# Patient Record
Sex: FEMALE | Race: BLACK OR AFRICAN AMERICAN | NOT HISPANIC OR LATINO
[De-identification: names, ages, dates, MRNs, and addresses within clinical notes are randomized per-mention and may not be internally consistent; named-entity substitution may affect disease eponyms.]

---
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
## 2023-01-03 ENCOUNTER — APPOINTMENT (OUTPATIENT)
Dept: PEDIATRIC CARDIOLOGY | Facility: CLINIC | Age: 18
End: 2023-01-03
Payer: COMMERCIAL

## 2023-01-03 VITALS
HEIGHT: 66.14 IN | BODY MASS INDEX: 17.72 KG/M2 | OXYGEN SATURATION: 100 % | HEART RATE: 74 BPM | SYSTOLIC BLOOD PRESSURE: 119 MMHG | WEIGHT: 110.23 LBS | DIASTOLIC BLOOD PRESSURE: 73 MMHG

## 2023-01-03 DIAGNOSIS — Z91.89 OTHER SPECIFIED PERSONAL RISK FACTORS, NOT ELSEWHERE CLASSIFIED: ICD-10-CM

## 2023-01-03 PROCEDURE — 99214 OFFICE O/P EST MOD 30 MIN: CPT | Mod: 25

## 2023-01-03 PROCEDURE — 93000 ELECTROCARDIOGRAM COMPLETE: CPT

## 2023-01-03 PROCEDURE — 93306 TTE W/DOPPLER COMPLETE: CPT

## 2023-01-03 NOTE — HISTORY OF PRESENT ILLNESS
[FreeTextEntry1] : History and present illness, review of systems and discussion were carried forward from previous notes, updated and modified where appropriate.\par \par PAST and PRESENT history:\par I had the pleasure of seeing in the cardiology office in cardiac consultation.  As you know, this is a 9-year-old girl, who was referred to cardiology for cardiac evaluation in the setting of HgSS disease.  Patient caries a lifetime risk of developing congestive heart failure, systolic and diastolic dysfunction.  Parents report hemoglobin level of approx.. 6.9,  Patient has received one blood transfusions ever in 2011.  Patient has had no episodes of acute crisis in the past..  Patient's Meds are listed in this note. Patient has no cardiac complaints.  Patient has been asymptomatic from the cardiovascular point of view and thriving. There is no shortness of breath, orthopnea, pallor, cyanosis, diaphoresis, or loss of consciousness. Patient has been feeding well and gaining weight. Patient currently takes no cardiac medications.  There is no history of sudden death, syncope or pacemakers in the family. No congenital neurosensory deafness known in a close family member.\par DANIELLE is now 11 year old and continues to be asymptomatic from the cardiovascular point of view and thriving. Parent/s and patient deny shortness of breath, orthopnea, pallor, cyanosis, diaphoresis, or loss of consciousness. Patient has been feeding well and gaining weight.  DANIELLE had one abdominal crisis in Oct 2016; Her Hb is approx 7.6 g% ; She currently takes no cardiac medications.\par \par 04/23/2019  -DANIELLE is now 13 year old and continues to be asymptomatic from the cardiovascular point of view and thriving. Her Hb is 8.7. She is on Hydroxyurea therapy.. She no surgeries. Parents and DANIELLE deny shortness of breath, orthopnea, pallor, cyanosis, diaphoresis, or loss of consciousness. DANIELLE has been feeding well and gaining weight. DANIELLE currently takes no cardiac medications.\par \par 06/23/2020  -DANIELLE is now 14 year old. She continues to be asymptomatic from the cardiovascular point of view and thriving. There were no recent fevers, cough or any other Covid- 19 related signs and symptoms in the close family. Parents and DANIELLE deny shortness of breath, orthopnea, pallor, cyanosis, diaphoresis, or loss of consciousness. DANIELLE has been feeding well and gaining weight. DANIELLE currently takes no cardiac medications.\par \par 12/21/2021  -DANIELLE is now 16 year old. She continues to be asymptomatic from the cardiovascular point of view and thriving.  Her Hb is 8.2 gr% and stable. She did not have crisis lately.  DANIELLE was not sick with Covid 19 dis. and was vaccinated in Dec 2021 (booster). Mother and Father were vaccinated. There were no recent fevers, cough or any other Covid -19 related signs and symptoms in the close family.\par Parents and DANIELLE deny shortness of breath, orthopnea, pallor, cyanosis, diaphoresis, or loss of consciousness. DANIELLE has been feeding well and gaining weight. DANIELLE currently takes no cardiac medications. Other pain and pulmonary meds are listed bellow and include Hydroxyurea. \par \par 01/03/2023  -DANIELLE is now 17 year old. She continues to be asymptomatic from the cardiovascular point of view and thriving. 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 Parents and DANIELLE deny shortness of breath, orthopnea, pallor, cyanosis, diaphoresis, or loss of consciousness. Her Hb in Aug 2022 was 8 gr%. DANIELLE has been feeding well and gaining weight. DANIELLE currently takes no cardiac medications. She is on Hydroxyurea. There were no recent fevers, cough or any other Covid -19 related signs and symptoms in the close family.\par \par

## 2023-01-03 NOTE — CARDIOLOGY SUMMARY
[Today's Date] : [unfilled] [FreeTextEntry1] : QRS axis to 33° and NSR at a rate of 78 BPM. There was no atrial enlargement. There was no ventricular hypertrophy. There were no ST-T changes and all intervals were normal.\par  [FreeTextEntry2] : Summary:\par 1.  {S,D,S } Situs solitus, D-ventricular looping, normally related great arteries.\par 2. Normal right ventricular morphology with qualitatively normal size and systolic function.\par 3. 3 D volumetric analysis revealed a normal concentric contraction and normal RV EF of 58%.\par 4. Globular left ventricle and mildly dilated left ventricle.\par 5. Normal left ventricular systolic function.\par 6. Normal left ventricular diastolic function.\par 7. 3 D volumetric analysis revealed a normal concentric contraction and normal LV EF of 57%.     Normal cardiac index of 4.45 ml/min/m2.\par 8. Physiologic mitral valve regurgitation.\par 9. No evidence of pulmonary hypertension.\par 10. No pericardial effusion. \par 11. There has been no significant interval change..\par

## 2023-01-03 NOTE — END OF VISIT
[FreeTextEntry3] : I, Dr. Wood, personally performed the evaluation and management (E/M) services for this patient who is present today. E/M includes conducting the examination, assessing all new/exacerbated conditions, reassessing pre-existing conditions and setting up a new or updated plan of care. Today, the RN (Registered Nurse) documented the Chief Complaint, source of information and performed med and allergy reconciliation with or without education.\par

## 2023-01-03 NOTE — REASON FOR VISIT
[Mother] : mother [Patient] : patient [Initial Consultation] : an initial consultation for [Noncardiac Disease] : cardiovascular evaluation  [Sickle Cell Disease] : in the setting of sickle cell disease [Father] : father [FreeTextEntry3] : HbSS

## 2023-01-03 NOTE — CONSULT LETTER
[Today's Date] : [unfilled] [Name] : Name: [unfilled] [] : : ~~ [Today's Date:] : [unfilled] [Dear  ___:] : Dear Dr. [unfilled]: [Consult] : I had the pleasure of evaluating your patient, [unfilled]. My full evaluation follows. [Consult - Single Provider] : Thank you very much for allowing me to participate in the care of this patient. If you have any questions, please do not hesitate to contact me. [Sincerely,] : Sincerely, [DrDevyn  ___] : Dr. HERBERT [FreeTextEntry4] : Jaz Landaverde MD [FreeTextEntry5] : Pediatric Hematology [FreeTextEntry6] : [place] [address] [FreeTextEntry7] : Ph: [phone] [de-identified] : Tomasz Wood MD, FACC, FAAP\par Pediatric Cardiology\par [facility]\par [facility]\par Tel:    [phone]\par Fax:   [phone]\par Email: [email or web address] \par \par Email: [email or web address] <[email or web address] \par

## 2023-05-26 ENCOUNTER — NON-APPOINTMENT (OUTPATIENT)
Age: 18
End: 2023-05-26

## 2023-06-05 ENCOUNTER — OUTPATIENT (OUTPATIENT)
Dept: OUTPATIENT SERVICES | Age: 18
LOS: 1 days | Discharge: HOME | End: 2023-06-05

## 2023-06-05 ENCOUNTER — APPOINTMENT (OUTPATIENT)
Dept: PEDIATRIC PULMONARY CYSTIC FIB | Facility: CLINIC | Age: 18
End: 2023-06-05

## 2023-06-07 ENCOUNTER — APPOINTMENT (OUTPATIENT)
Dept: PEDIATRIC HEMATOLOGY/ONCOLOGY | Facility: CLINIC | Age: 18
End: 2023-06-07
Payer: COMMERCIAL

## 2023-06-07 VITALS
TEMPERATURE: 97.7 F | RESPIRATION RATE: 20 BRPM | HEIGHT: 66.54 IN | OXYGEN SATURATION: 98 % | DIASTOLIC BLOOD PRESSURE: 62 MMHG | HEART RATE: 65 BPM | SYSTOLIC BLOOD PRESSURE: 101 MMHG | WEIGHT: 108.25 LBS | BODY MASS INDEX: 17.19 KG/M2

## 2023-06-07 PROCEDURE — 99215 OFFICE O/P EST HI 40 MIN: CPT

## 2023-06-08 ENCOUNTER — NON-APPOINTMENT (OUTPATIENT)
Age: 18
End: 2023-06-08

## 2023-06-08 DIAGNOSIS — D57.1 SICKLE-CELL DISEASE WITHOUT CRISIS: ICD-10-CM

## 2023-06-12 NOTE — RESULTS/DATA
[FreeTextEntry1] : Labs: 12/19/2022\par CBC: WBC 7.7, ANC 4943, Hb 8.9, Platelets 232 retic pending\par Vit D 20 (decrease from 60)\par CMP wnl\par Iron studies wnl\par UA with blood and proteinuria (on menses)\par Hemoglobin Electrophoresis pending

## 2023-06-12 NOTE — PHYSICAL EXAM
[No focal deficits] : no focal deficits [Normal] : affect appropriate [de-identified] : supple [de-identified] : brisk CR

## 2023-06-12 NOTE — HISTORY OF PRESENT ILLNESS
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  She has occasional VOEs.  She started Hydroxyurea in 10/2015.  She has a history of LVH and follows with Cardiology.  Menarche at 10yo.  All 3 siblings are not an HLA match.  Met with transplant team, potential donor no longer available.\nando Admitted 6/29/19 with back pain not relieved with home meds.  Spiked temp 6/30/19, RVP and BCx neg, received empiric Ceftriaxone.  Hb decreased to 6.5g/dL, however had retic count and was asymptomatic.  At f/u outpatient was found to have improving Hb and reticulocytosis, however had symptoms of headache and dizziness.  Therefore received PRBCs on 7/6/19.\nando Had abdominal pain and emesis when she moved to school and was seen in the ED at [place] 8/14 where she was found to be dehydrated.  She did not require any admission. She remained afebrile. [de-identified] : Doing well.\par Afebrile.\par No recent illness.\par No ED visits or admissions since last visit.\par Reports adherence with daily Hydroxyurea.\par \par Normal menses, monthly, lasts 5 days,   2 ppd. Tolerable pain.\par \par Attends [employer].  Still pursuing nursing.  School went well.  Has been denied on campus housing despite our letter and filling in the appropriate forms.\par \par HEADSS:  feels safe at school and home; no smoking, drinking, vaping or illicit drug use; not sexually active.

## 2023-06-12 NOTE — CONSULT LETTER
[Dear  ___] : Dear  [unfilled], [Courtesy Letter:] : I had the pleasure of seeing your patient, [unfilled], in my office today. [Please see my note below.] : Please see my note below. [Consult Closing:] : Thank you very much for allowing me to participate in the care of this patient.  If you have any questions, please do not hesitate to contact me. [Sincerely,] : Sincerely, [FreeTextEntry2] : Mei Elliott M.D.\par [address]\[place] [address]\par Telephone #:  [phone]\par  [FreeTextEntry3] : Jaz Moran MD, MPH\par Attending Physician\par [facility]\par Hematology /Oncology and Stem Cell Transplantation\par  of Pediatrics\par [employer] [facility]

## 2023-06-12 NOTE — HISTORY OF PRESENT ILLNESS
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  She has occasional VOEs.  She started Hydroxyurea in 10/2015.  She has a history of LVH and follows with Cardiology.  Menarche at 12yo.  All 3 siblings are not an HLA match.  Met with transplant team, potential donor no longer available.\nando Admitted 6/29/19 with back pain not relieved with home meds.  Spiked temp 6/30/19, RVP and BCx neg, received empiric Ceftriaxone.  Hb decreased to 6.5g/dL, however had retic count and was asymptomatic.  At f/u outpatient was found to have improving Hb and reticulocytosis, however had symptoms of headache and dizziness.  Therefore received PRBCs on 7/6/19.\nando Had abdominal pain and emesis when she moved to school and was seen in the ED at [place] 8/14 where she was found to be dehydrated.  She did not require any admission. She remained afebrile. [de-identified] : Had a VOE of the abdomen at the beginning of the month.  \par Relieved with ibuprofen and also took Miralax.\par This occurred during finals.\par Was able to complete all the exams.\par Missed a couple of doses of Hydroxyurea during school.\par However, now that she is home, has missed 3 doses in 2 weeks.\par Afebrile.\par No recent illness.\par No ED visits or admissions since last visit.\par Normal menses, monthly, lasts 5 days,   2 ppd. Tolerable pain.\par Seen by Pulmonology.\par \par Attends [employer].  Interested in Nursing.  Doing well, no concerns.\par \par HEADSS:  feels safe at school and home; no smoking, drinking, vaping or illicit drug use; not sexually active.

## 2023-06-12 NOTE — CONSULT LETTER
[Dear  ___] : Dear  [unfilled], normal mood with appropriate affect [Courtesy Letter:] : I had the pleasure of seeing your patient, [unfilled], in my office today. [Please see my note below.] : Please see my note below. [Consult Closing:] : Thank you very much for allowing me to participate in the care of this patient.  If you have any questions, please do not hesitate to contact me. [Sincerely,] : Sincerely, [FreeTextEntry2] : Mei Elliott M.D.\par [address]\[place] [address]\par Telephone #:  [phone]\par  [FreeTextEntry3] : Jaz Moran MD, MPH\par Attending Physician\par [facility]\par Hematology /Oncology and Stem Cell Transplantation\par  of Pediatrics\par [employer] at [facility]

## 2023-06-12 NOTE — PHYSICAL EXAM
[No focal deficits] : no focal deficits [Normal] : affect appropriate [de-identified] : supple [de-identified] : brisk CR

## 2023-06-13 ENCOUNTER — NON-APPOINTMENT (OUTPATIENT)
Age: 18
End: 2023-06-13

## 2023-07-03 ENCOUNTER — INPATIENT (INPATIENT)
Facility: HOSPITAL | Age: 18
LOS: 0 days | Discharge: ANOTHER HOSPITAL | End: 2023-07-03
Attending: STUDENT IN AN ORGANIZED HEALTH CARE EDUCATION/TRAINING PROGRAM | Admitting: STUDENT IN AN ORGANIZED HEALTH CARE EDUCATION/TRAINING PROGRAM
Payer: COMMERCIAL

## 2023-07-03 ENCOUNTER — INPATIENT (INPATIENT)
Age: 18
LOS: 2 days | Discharge: HOME | End: 2023-07-06
Payer: COMMERCIAL

## 2023-07-03 ENCOUNTER — TRANSCRIPTION ENCOUNTER (OUTPATIENT)
Age: 18
End: 2023-07-03

## 2023-07-03 VITALS
DIASTOLIC BLOOD PRESSURE: 65 MMHG | HEART RATE: 92 BPM | SYSTOLIC BLOOD PRESSURE: 115 MMHG | TEMPERATURE: 98 F | RESPIRATION RATE: 16 BRPM | OXYGEN SATURATION: 100 %

## 2023-07-03 VITALS
TEMPERATURE: 98 F | OXYGEN SATURATION: 100 % | SYSTOLIC BLOOD PRESSURE: 110 MMHG | HEART RATE: 80 BPM | RESPIRATION RATE: 18 BRPM | DIASTOLIC BLOOD PRESSURE: 68 MMHG

## 2023-07-03 VITALS — HEIGHT: 55 IN | WEIGHT: 107.14 LBS

## 2023-07-03 DIAGNOSIS — D57.219 SICKLE-CELL/HB-C DISEASE WITH CRISIS, UNSPECIFIED: ICD-10-CM

## 2023-07-03 DIAGNOSIS — D57.00 HB-SS DISEASE WITH CRISIS, UNSPECIFIED: ICD-10-CM

## 2023-07-03 DIAGNOSIS — D57.1 SICKLE-CELL DISEASE WITHOUT CRISIS: ICD-10-CM

## 2023-07-03 DIAGNOSIS — Z29.9 ENCOUNTER FOR PROPHYLACTIC MEASURES, UNSPECIFIED: ICD-10-CM

## 2023-07-03 LAB
ANION GAP SERPL CALC-SCNC: 16 MMOL/L — HIGH (ref 7–14)
B PERT DNA SPEC QL NAA+PROBE: SIGNIFICANT CHANGE UP
B PERT+PARAPERT DNA PNL SPEC NAA+PROBE: SIGNIFICANT CHANGE UP
BASOPHILS # BLD AUTO: 0.1 K/UL — SIGNIFICANT CHANGE UP (ref 0–0.2)
BASOPHILS NFR BLD AUTO: 0.8 % — SIGNIFICANT CHANGE UP (ref 0–2)
BLD GP AB SCN SERPL QL: NEGATIVE — SIGNIFICANT CHANGE UP
BORDETELLA PARAPERTUSSIS (RAPRVP): SIGNIFICANT CHANGE UP
BUN SERPL-MCNC: 10 MG/DL — SIGNIFICANT CHANGE UP (ref 7–23)
C PNEUM DNA SPEC QL NAA+PROBE: SIGNIFICANT CHANGE UP
CALCIUM SERPL-MCNC: 9.4 MG/DL — SIGNIFICANT CHANGE UP (ref 8.4–10.5)
CHLORIDE SERPL-SCNC: 97 MMOL/L — LOW (ref 98–107)
CO2 SERPL-SCNC: 18 MMOL/L — LOW (ref 22–31)
CREAT SERPL-MCNC: 0.64 MG/DL — SIGNIFICANT CHANGE UP (ref 0.5–1.3)
EGFR: 130 ML/MIN/1.73M2 — SIGNIFICANT CHANGE UP
EOSINOPHIL # BLD AUTO: 0.11 K/UL — SIGNIFICANT CHANGE UP (ref 0–0.5)
EOSINOPHIL NFR BLD AUTO: 1.7 % — SIGNIFICANT CHANGE UP (ref 0–6)
FLUAV SUBTYP SPEC NAA+PROBE: SIGNIFICANT CHANGE UP
FLUBV RNA SPEC QL NAA+PROBE: SIGNIFICANT CHANGE UP
GLUCOSE SERPL-MCNC: 110 MG/DL — HIGH (ref 70–99)
HADV DNA SPEC QL NAA+PROBE: SIGNIFICANT CHANGE UP
HCG SERPL-ACNC: <1 MIU/ML — SIGNIFICANT CHANGE UP
HCOV 229E RNA SPEC QL NAA+PROBE: SIGNIFICANT CHANGE UP
HCOV HKU1 RNA SPEC QL NAA+PROBE: SIGNIFICANT CHANGE UP
HCOV NL63 RNA SPEC QL NAA+PROBE: SIGNIFICANT CHANGE UP
HCOV OC43 RNA SPEC QL NAA+PROBE: SIGNIFICANT CHANGE UP
HCT VFR BLD CALC: 20.4 % — CRITICAL LOW (ref 34.5–45)
HGB BLD-MCNC: 7.5 G/DL — LOW (ref 11.5–15.5)
HMPV RNA SPEC QL NAA+PROBE: SIGNIFICANT CHANGE UP
HPIV1 RNA SPEC QL NAA+PROBE: SIGNIFICANT CHANGE UP
HPIV2 RNA SPEC QL NAA+PROBE: SIGNIFICANT CHANGE UP
HPIV3 RNA SPEC QL NAA+PROBE: SIGNIFICANT CHANGE UP
HPIV4 RNA SPEC QL NAA+PROBE: SIGNIFICANT CHANGE UP
IANC: 8.55 K/UL — HIGH (ref 1.8–7.4)
IMM GRANULOCYTES NFR BLD AUTO: 3.3 % — HIGH (ref 0–0.9)
LYMPHOCYTES # BLD AUTO: 19.1 % — SIGNIFICANT CHANGE UP (ref 13–44)
LYMPHOCYTES # BLD AUTO: 2.22 K/UL — SIGNIFICANT CHANGE UP (ref 1–3.3)
M PNEUMO DNA SPEC QL NAA+PROBE: SIGNIFICANT CHANGE UP
MAGNESIUM SERPL-MCNC: 1.9 MG/DL — SIGNIFICANT CHANGE UP (ref 1.6–2.6)
MCHC RBC-ENTMCNC: 35.7 PG — HIGH (ref 27–34)
MCHC RBC-ENTMCNC: 36.8 GM/DL — HIGH (ref 32–36)
MCV RBC AUTO: 97.1 FL — SIGNIFICANT CHANGE UP (ref 80–100)
MONOCYTES # BLD AUTO: 1.01 K/UL — HIGH (ref 0–0.9)
MONOCYTES NFR BLD AUTO: 9.6 % — SIGNIFICANT CHANGE UP (ref 2–14)
NEUTROPHILS # BLD AUTO: 8.55 K/UL — HIGH (ref 1.8–7.4)
NEUTROPHILS NFR BLD AUTO: 64.3 % — SIGNIFICANT CHANGE UP (ref 43–77)
NRBC # BLD: 1 /100 WBCS — HIGH (ref 0–0)
NRBC # FLD: 0.12 K/UL — HIGH (ref 0–0)
PHOSPHATE SERPL-MCNC: 4.2 MG/DL — SIGNIFICANT CHANGE UP (ref 2.5–4.5)
PLATELET # BLD AUTO: 362 K/UL — SIGNIFICANT CHANGE UP (ref 150–400)
POTASSIUM SERPL-MCNC: 4.2 MMOL/L — SIGNIFICANT CHANGE UP (ref 3.5–5.3)
POTASSIUM SERPL-SCNC: 4.2 MMOL/L — SIGNIFICANT CHANGE UP (ref 3.5–5.3)
RAPID RVP RESULT: SIGNIFICANT CHANGE UP
RBC # BLD: 2.1 M/UL — LOW (ref 3.8–5.2)
RBC # BLD: 2.1 M/UL — LOW (ref 3.8–5.2)
RBC # FLD: 20.2 % — HIGH (ref 10.3–14.5)
RETICS #: 194.7 K/UL — HIGH (ref 25–125)
RETICS/RBC NFR: 9.3 % — HIGH (ref 0.5–2.5)
RH IG SCN BLD-IMP: POSITIVE — SIGNIFICANT CHANGE UP
RSV RNA SPEC QL NAA+PROBE: SIGNIFICANT CHANGE UP
RV+EV RNA SPEC QL NAA+PROBE: SIGNIFICANT CHANGE UP
SARS-COV-2 RNA SPEC QL NAA+PROBE: SIGNIFICANT CHANGE UP
SODIUM SERPL-SCNC: 131 MMOL/L — LOW (ref 135–145)
WBC # BLD: 12.4 K/UL — HIGH (ref 3.8–10.5)
WBC # FLD AUTO: 12.4 K/UL — HIGH (ref 3.8–10.5)

## 2023-07-03 PROCEDURE — 99285 EMERGENCY DEPT VISIT HI MDM: CPT

## 2023-07-03 PROCEDURE — 99223 1ST HOSP IP/OBS HIGH 75: CPT

## 2023-07-03 PROCEDURE — 71046 X-RAY EXAM CHEST 2 VIEWS: CPT | Mod: 26

## 2023-07-03 RX ORDER — MORPHINE SULFATE 50 MG/1
4 CAPSULE, EXTENDED RELEASE ORAL
Refills: 0 | Status: DISCONTINUED | OUTPATIENT
Start: 2023-07-03 | End: 2023-07-03

## 2023-07-03 RX ORDER — POLYETHYLENE GLYCOL 3350 17 G/17G
17 POWDER, FOR SOLUTION ORAL
Refills: 0 | Status: DISCONTINUED | OUTPATIENT
Start: 2023-07-03 | End: 2023-07-06

## 2023-07-03 RX ORDER — KETOROLAC TROMETHAMINE 30 MG/ML
24 SYRINGE (ML) INJECTION EVERY 6 HOURS
Refills: 0 | Status: DISCONTINUED | OUTPATIENT
Start: 2023-07-04 | End: 2023-07-05

## 2023-07-03 RX ORDER — MORPHINE SULFATE 50 MG/1
4 CAPSULE, EXTENDED RELEASE ORAL ONCE
Refills: 0 | Status: DISCONTINUED | OUTPATIENT
Start: 2023-07-03 | End: 2023-07-03

## 2023-07-03 RX ORDER — KETOROLAC TROMETHAMINE 30 MG/ML
15 SYRINGE (ML) INJECTION ONCE
Refills: 0 | Status: DISCONTINUED | OUTPATIENT
Start: 2023-07-03 | End: 2023-07-03

## 2023-07-03 RX ORDER — ALBUTEROL 90 UG/1
2 AEROSOL, METERED ORAL EVERY 4 HOURS
Refills: 0 | Status: DISCONTINUED | OUTPATIENT
Start: 2023-07-03 | End: 2023-07-06

## 2023-07-03 RX ORDER — SODIUM CHLORIDE 9 MG/ML
1000 INJECTION INTRAMUSCULAR; INTRAVENOUS; SUBCUTANEOUS
Refills: 0 | Status: DISCONTINUED | OUTPATIENT
Start: 2023-07-03 | End: 2023-07-03

## 2023-07-03 RX ORDER — HYDROXYUREA 500 MG/1
1500 CAPSULE ORAL DAILY
Refills: 0 | Status: DISCONTINUED | OUTPATIENT
Start: 2023-07-03 | End: 2023-07-03

## 2023-07-03 RX ORDER — ALBUTEROL 90 UG/1
2 AEROSOL, METERED ORAL EVERY 6 HOURS
Refills: 0 | Status: DISCONTINUED | OUTPATIENT
Start: 2023-07-03 | End: 2023-07-03

## 2023-07-03 RX ORDER — MORPHINE SULFATE 50 MG/1
5 CAPSULE, EXTENDED RELEASE ORAL
Refills: 0 | Status: DISCONTINUED | OUTPATIENT
Start: 2023-07-03 | End: 2023-07-04

## 2023-07-03 RX ORDER — FLUTICASONE PROPIONATE 220 MCG
1 AEROSOL WITH ADAPTER (GRAM) INHALATION
Refills: 0 | Status: DISCONTINUED | OUTPATIENT
Start: 2023-07-03 | End: 2023-07-06

## 2023-07-03 RX ORDER — ENOXAPARIN SODIUM 100 MG/ML
40 INJECTION SUBCUTANEOUS DAILY
Refills: 0 | Status: DISCONTINUED | OUTPATIENT
Start: 2023-07-03 | End: 2023-07-06

## 2023-07-03 RX ORDER — ACETAMINOPHEN 500 MG
1000 TABLET ORAL ONCE
Refills: 0 | Status: COMPLETED | OUTPATIENT
Start: 2023-07-03 | End: 2023-07-03

## 2023-07-03 RX ORDER — HEPARIN SODIUM 5000 [USP'U]/ML
5000 INJECTION INTRAVENOUS; SUBCUTANEOUS EVERY 8 HOURS
Refills: 0 | Status: DISCONTINUED | OUTPATIENT
Start: 2023-07-03 | End: 2023-07-03

## 2023-07-03 RX ORDER — FOLIC ACID 0.8 MG
1 TABLET ORAL DAILY
Refills: 0 | Status: DISCONTINUED | OUTPATIENT
Start: 2023-07-03 | End: 2023-07-03

## 2023-07-03 RX ORDER — DEXTROSE MONOHYDRATE, SODIUM CHLORIDE, AND POTASSIUM CHLORIDE 50; .745; 4.5 G/1000ML; G/1000ML; G/1000ML
1000 INJECTION, SOLUTION INTRAVENOUS
Refills: 0 | Status: DISCONTINUED | OUTPATIENT
Start: 2023-07-03 | End: 2023-07-06

## 2023-07-03 RX ORDER — ACETAMINOPHEN 500 MG
2 TABLET ORAL
Qty: 0 | Refills: 0 | DISCHARGE
Start: 2023-07-03

## 2023-07-03 RX ORDER — IBUPROFEN 200 MG
1 TABLET ORAL
Qty: 0 | Refills: 0 | DISCHARGE

## 2023-07-03 RX ORDER — CHOLECALCIFEROL (VITAMIN D3) 125 MCG
1000 CAPSULE ORAL DAILY
Refills: 0 | Status: DISCONTINUED | OUTPATIENT
Start: 2023-07-03 | End: 2023-07-06

## 2023-07-03 RX ORDER — ACETAMINOPHEN 500 MG
650 TABLET ORAL EVERY 6 HOURS
Refills: 0 | Status: DISCONTINUED | OUTPATIENT
Start: 2023-07-03 | End: 2023-07-03

## 2023-07-03 RX ORDER — FAMOTIDINE 10 MG/ML
20 INJECTION INTRAVENOUS
Refills: 0 | Status: DISCONTINUED | OUTPATIENT
Start: 2023-07-03 | End: 2023-07-06

## 2023-07-03 RX ORDER — KETOROLAC TROMETHAMINE 30 MG/ML
30 SYRINGE (ML) INJECTION EVERY 6 HOURS
Refills: 0 | Status: DISCONTINUED | OUTPATIENT
Start: 2023-07-03 | End: 2023-07-03

## 2023-07-03 RX ORDER — FOLIC ACID 0.8 MG
1 TABLET ORAL DAILY
Refills: 0 | Status: DISCONTINUED | OUTPATIENT
Start: 2023-07-03 | End: 2023-07-06

## 2023-07-03 RX ORDER — SENNA PLUS 8.6 MG/1
1 TABLET ORAL
Refills: 0 | Status: DISCONTINUED | OUTPATIENT
Start: 2023-07-03 | End: 2023-07-06

## 2023-07-03 RX ADMIN — Medication 400 MILLIGRAM(S): at 07:08

## 2023-07-03 RX ADMIN — MORPHINE SULFATE 4 MILLIGRAM(S): 50 CAPSULE, EXTENDED RELEASE ORAL at 08:45

## 2023-07-03 RX ADMIN — SODIUM CHLORIDE 60 MILLILITER(S): 9 INJECTION INTRAMUSCULAR; INTRAVENOUS; SUBCUTANEOUS at 18:41

## 2023-07-03 RX ADMIN — Medication 15 MILLIGRAM(S): at 09:44

## 2023-07-03 RX ADMIN — MORPHINE SULFATE 5 MILLIGRAM(S): 50 CAPSULE, EXTENDED RELEASE ORAL at 22:40

## 2023-07-03 RX ADMIN — Medication 30 MILLIGRAM(S): at 18:39

## 2023-07-03 RX ADMIN — POLYETHYLENE GLYCOL 3350 17 GRAM(S): 17 POWDER, FOR SOLUTION ORAL at 23:08

## 2023-07-03 RX ADMIN — Medication 1000 UNIT(S): at 23:08

## 2023-07-03 RX ADMIN — MORPHINE SULFATE 10 MILLIGRAM(S): 50 CAPSULE, EXTENDED RELEASE ORAL at 22:10

## 2023-07-03 RX ADMIN — SENNA PLUS 1 TABLET(S): 8.6 TABLET ORAL at 23:08

## 2023-07-03 RX ADMIN — MORPHINE SULFATE 4 MILLIGRAM(S): 50 CAPSULE, EXTENDED RELEASE ORAL at 17:20

## 2023-07-03 RX ADMIN — MORPHINE SULFATE 4 MILLIGRAM(S): 50 CAPSULE, EXTENDED RELEASE ORAL at 16:16

## 2023-07-03 RX ADMIN — DEXTROSE MONOHYDRATE, SODIUM CHLORIDE, AND POTASSIUM CHLORIDE 90 MILLILITER(S): 50; .745; 4.5 INJECTION, SOLUTION INTRAVENOUS at 22:13

## 2023-07-03 RX ADMIN — MORPHINE SULFATE 4 MILLIGRAM(S): 50 CAPSULE, EXTENDED RELEASE ORAL at 07:08

## 2023-07-03 RX ADMIN — Medication 15 MILLIGRAM(S): at 10:00

## 2023-07-03 RX ADMIN — FAMOTIDINE 20 MILLIGRAM(S): 10 INJECTION INTRAVENOUS at 23:47

## 2023-07-03 RX ADMIN — MORPHINE SULFATE 4 MILLIGRAM(S): 50 CAPSULE, EXTENDED RELEASE ORAL at 11:40

## 2023-07-03 RX ADMIN — Medication 30 MILLIGRAM(S): at 19:40

## 2023-07-03 RX ADMIN — MORPHINE SULFATE 4 MILLIGRAM(S): 50 CAPSULE, EXTENDED RELEASE ORAL at 08:24

## 2023-07-03 NOTE — PATIENT PROFILE ADULT - FUNCTIONAL SCREEN CURRENT LEVEL: COMMUNICATION, MLM
I was able to get a hold of Gricelda and did give her the results from that date she was in for visit 6/25/17.     Everything that we had tested her for was all Negative.     No treatment or follow up care needed or required.     She had no further concerns or questions at this time.      0 = understands/communicates without difficulty

## 2023-07-03 NOTE — ED PROVIDER NOTE - PROGRESS NOTE DETAILS
Carlos Eduardo Poole, DO PGY-3: After Tylenol Toradol, 2 rounds of morphine patient still in significant pain and required a third round of morphine.  Patient like to be admitted for pain control.

## 2023-07-03 NOTE — H&P ADULT - ASSESSMENT
17-year-old female with history of sickle cell SS with last admission for sickle cell disease years ago, presenting to ER for lower back pain and pain in bilateral thighs feeling like her typical sickle cell pain.

## 2023-07-03 NOTE — DISCHARGE NOTE PROVIDER - NSDCMRMEDTOKEN_GEN_ALL_CORE_FT
Flovent 44 mcg/inh inhalation aerosol with adapter: 2 puff(s) inhaled 2 times a day  folic acid 1 mg oral tablet: 1 tab(s) orally once a day  hydroxyurea 500 mg oral capsule: 3 caps for 5 days (Sunday to Thursday), 2 caps for 2 days (Friday to Saturday)  ibuprofen 400 mg oral tablet: 1 tab(s) orally every 6 hours until course of Oxycodone completed, then as needed every 6 hours.  lactulose 10 g/15 mL oral syrup: 30 milliliter(s) orally 2 times a day, As Needed for constipation  oxyCODONE 5 mg oral tablet: 1 tab every 4 hours for the first two days  1 tab every 6 hours for the next two days  1 tab every 6 hours as needed for pain MDD:30 mg  polyethylene glycol 3350 oral powder for reconstitution: 17 gram(s) orally 2 times a day, As Needed   senna oral tablet: 2 tab(s) orally once a day   Ventolin 90 mcg/inh inhalation aerosol: 2 puff(s) inhaled every 4 hours, As Needed shortness of breath  Vitamin D3 1000 intl units oral capsule: 1 cap(s) orally once a day   acetaminophen 325 mg oral tablet: 2 tab(s) orally every 6 hours As needed Temp greater or equal to 38C (100.4F), Mild Pain (1 - 3)  Flovent 44 mcg/inh inhalation aerosol with adapter: 2 puff(s) inhaled 2 times a day  folic acid 1 mg oral tablet: 1 tab(s) orally once a day  hydroxyurea 500 mg oral capsule: 3 caps for 5 days (Sunday to Thursday), 2 caps for 2 days (Friday to Saturday)  lactulose 10 g/15 mL oral syrup: 30 milliliter(s) orally 2 times a day, As Needed for constipation  oxyCODONE 5 mg oral tablet: 1 tab every 4 hours for the first two days  1 tab every 6 hours for the next two days  1 tab every 6 hours as needed for pain MDD:30 mg  polyethylene glycol 3350 oral powder for reconstitution: 17 gram(s) orally 2 times a day, As Needed   senna oral tablet: 2 tab(s) orally once a day   Ventolin 90 mcg/inh inhalation aerosol: 2 puff(s) inhaled every 4 hours, As Needed shortness of breath  Vitamin D3 1000 intl units oral capsule: 1 cap(s) orally once a day

## 2023-07-03 NOTE — H&P ADULT - HISTORY OF PRESENT ILLNESS
17-year-old female with history of sickle cell SS with last admission for sickle cell disease years ago, presenting to ER for lower back pain and pain in bilateral thighs feeling like her typical sickle cell pain.  Pain started last night while sleeping in bed, no trauma, no focal weakness or numbness.  Patient took ibuprofen and oxycodone prior to arrival with minimal relief.  No shortness of breath or cough or chest pains. Pt states normally does not need to take any pain killers.   Denies fever, chills , chest pain , abdominal pain, recent illness , sick contacts, dehydration.

## 2023-07-03 NOTE — H&P ADULT - PROBLEM SELECTOR PLAN 1
pt last pain crisis 4 years ago  denies dehydration, recent illness   IVF maintenance   Toradol and Morphine for pain control as per peds team - patient being transferred to peds   Do not think she requires PCA Pump - states her crisis have been controlled with oral medications   check CXR to r.o acute chest syndrome  denies pain in hips or shoulders - if has pain obtain xray of hips to r/o avascular necrosis   c/w home Hydroxyurea and folic acid

## 2023-07-03 NOTE — H&P ADULT - NSHPPHYSICALEXAM_GEN_ALL_CORE
.  VITAL SIGNS:  T(F): 98.3 (07-03-23 @ 16:16), Max: 98.5 (07-03-23 @ 12:39)  HR: 86 (07-03-23 @ 16:16) (75 - 97)  BP: 120/60 (07-03-23 @ 16:16) (110/68 - 120/60)  BP(mean): --  RR: 18 (07-03-23 @ 16:16) (16 - 18)  SpO2: 100% (07-03-23 @ 16:16) (98% - 100%)    PHYSICAL EXAM:    Constitutional: WDWN resting comfortably in bed; NAD  HEENT: NC/AT, PERRL, EOMI, anicteric sclera, no nasal discharge; uvula midline, no oropharyngeal erythema or exudates; MMM  Neck: supple; no JVD or thyromegaly  Respiratory: CTA B/L; no W/R/R, no retractions  Cardiac: +S1/S2; RRR; no M/R/G; PMI non-displaced  Gastrointestinal: soft, NT/ND; no rebound or guarding; +BSx4  Back: spine midline, no bony tenderness or step-offs; no CVAT B/L  Extremities: WWP, no clubbing or cyanosis; no peripheral edema  Musculoskeletal: NROM x4; no joint swelling, tenderness or erythema  Vascular: 2+ radial, femoral, DP/PT pulses B/L  Dermatologic: skin warm, dry and intact; no rashes, wounds, or scars  Lymphatic: no submandibular or cervical LAD  Neurologic: AAOx3; CNII-XII grossly intact; no focal deficits  Psychiatric: affect and characteristics of appearance, verbalizations, behaviors are appropriate, denies SI/HI/AH/VH

## 2023-07-03 NOTE — H&P ADULT - NSHPLABSRESULTS_GEN_ALL_CORE
LABS:                          7.5    12.40 )-----------( 362      ( 03 Jul 2023 07:00 )             20.4     07-03    131<L>  |  97<L>  |  10  ----------------------------<  110<H>  4.2   |  18<L>  |  0.64    Ca    9.4      03 Jul 2023 07:00  Phos  4.2     07-03  Mg     1.90     07-03

## 2023-07-03 NOTE — H&P ADULT - HISTORY OF PRESENT ILLNESS
Florina is a 17-year-old female with SCD (Hgb SS) on HU who presented to the [facility] ER for lower back pain and pain in bilateral thighs feeling like her typical sickle cell pain.  Pain started last night while sleeping in bed, no trauma, no focal weakness or numbness.  Patient took ibuprofen and oxycodone prior to arrival with minimal relief.  No shortness of breath or cough or chest pains. Recently finished menses within the lat couple of of days. Pt states normally does not need to take any pain killers. Pain currently 8/10 at lower back. Responds to pain medication initially but relief not lasting.  Denies fever, chills , chest pain , abdominal pain, recent illness , sick contacts, dehydration. Endorses constipation, no BM in last 2 days. Also endorses decreased PO intake over the last day due to pain.   Of note, patient was initially admitted to [facility], however was transferred to [facility] as she has not yet transitioned care to adult medicine.

## 2023-07-03 NOTE — H&P ADULT - NSHPREVIEWOFSYSTEMS_GEN_ALL_CORE
Gen: as above in HPI  HEENT: no changes in vision, no mouth sores, no URI symptoms  CV: no CP, no palpitations  Pulm: No SOB or increased WOB  Abd: +constipation, no abd pain or N/V  MSK: no acute pain in legs or arms at time of admission, no swelling of extremities  : No dysuria  Neuro: no headache, no AMS

## 2023-07-03 NOTE — DISCHARGE NOTE PROVIDER - HOSPITAL COURSE
17-year-old female with history of sickle cell SS with last admission for sickle cell disease years ago, presenting to ER for lower back pain and pain in bilateral thighs feeling like her typical sickle cell pain.       Problem/Plan - 1:  ·  Problem: Sickle cell crisis.   ·  Plan: pt last pain crisis 4 years ago  denies dehydration, recent illness   IVF maintenance   Toradol and Morphine for pain control as per peds team - patient being transferred to peds   Do not think she requires PCA Pump - states her crisis have been controlled with oral medications   check CXR to r.o acute chest syndrome  denies pain in hips or shoulders - if has pain obtain xray of hips to r/o avascular necrosis   c/w home Hydroxyurea and folic acid.     Problem/Plan - 2:  ·  Problem: Sickle cell anemia.   ·  Plan: hgb baseline 7.0 as per patient, hgb stable  monitor h/h   transfuse for hgb < 7  rest as above.     Problem/Plan - 3:  ·  Problem: Need for prophylactic measure.   ·  Plan: dvt ppx: heparin subq.

## 2023-07-03 NOTE — ED ADULT NURSE NOTE - NSFALLUNIVINTERV_ED_ALL_ED
Bed/Stretcher in lowest position, wheels locked, appropriate side rails in place/Call bell, personal items and telephone in reach/Instruct patient to call for assistance before getting out of bed/chair/stretcher/Non-slip footwear applied when patient is off stretcher/[place] to call system/Physically safe environment - no spills, clutter or unnecessary equipment/Purposeful proactive rounding/Room/bathroom lighting operational, light cord in reach

## 2023-07-03 NOTE — ED ADULT TRIAGE NOTE - CHIEF COMPLAINT QUOTE
lower back pain since yesterday, similar to crisis pain, endorsing no additional complaints - hx sickle cell

## 2023-07-03 NOTE — ED ADULT NURSE REASSESSMENT NOTE - NS ED NURSE REASSESS COMMENT FT1
Pt a&ox3, endorses pain to back, medicated as ordered, breathing even and unlabored, afebrile, skin cool dry and intact, ivl clear and patent, safety maintained, bed in lowest position, side rails up, father at bedside, will continue to monitor.
Report given to MANDY Ontiveros. Patient transported in stable condition to [facility].
Report received from MANDY Estrada. Patient A&Ox4 and ambulatory at baseline. Respirations even and unlabored, no signs/symptoms of acute distress. Patient denies dyspnea, shortness of breath, and chest pain. Patient rates pain 6/10 and states it is tolerable at this time. Safety measures in place and call bell within reach.

## 2023-07-03 NOTE — H&P ADULT - NSHPPHYSICALEXAM_GEN_ALL_CORE
T(C): 37.3 (07-03-23 @ 21:40), Max: 37.3 (07-03-23 @ 21:40)  HR: 96 (07-03-23 @ 21:40) (75 - 97)  BP: 106/60 (07-03-23 @ 21:40) (106/60 - 120/60)  RR: 24 (07-03-23 @ 21:40) (16 - 24)  SpO2: 100% (07-03-23 @ 21:40) (98% - 100%)    CONSTITUTIONAL: Well groomed, curled up in fetal position in bed due to pain but able to move around  EYES: PERRLA, No conjunctival or scleral injection, non-icteric  ENMT: Oral mucosa with moist membranes. Normal dentition             NECK: Supple  RESP: No respiratory distress, no use of accessory muscles; CTA b/l, no WRR  CV: RRR, +S1S2, no MRG; no JVD; no peripheral edema  GI: Soft, mild tenderness to palpation radiating to the back but no rigidity or distension; no palpable masses; no hepatosplenomegaly  MSK: no gross deformities, no swelling  SKIN: No rashes or ulcers noted; no subcutaneous nodules or induration palpable  NEURO: CN grossly intact  PSYCH: Appropriate

## 2023-07-03 NOTE — ED PROVIDER NOTE - PHYSICAL EXAMINATION
General: Patient alert in no apparent distress  Skin: Dry and intact  HEENT: Head atraumatic. Oral mucosa moist.   Eyes: Conjunctiva normal  Cardiac: Regular rhythm and rate. No pretibial edema b/l  Respiratory: Lungs clear b/l and symmetric. No respiratory distress. Able to speak in complete sentences.  Gastrointestinal: Abdomen soft, nondistended, nontender  Musculoskeletal: Moves all extremities spontaneously. no midline back spinal bony tenderness  Neurological: alert and oriented to person, place, and time  Psychiatric: Calm and cooperative

## 2023-07-03 NOTE — ED PROVIDER NOTE - CLINICAL SUMMARY MEDICAL DECISION MAKING FREE TEXT BOX
17-year-old female with history of sickle cell SS with last admission for sickle cell disease years ago, presenting to ER for lower back pain and pain in bilateral thighs feeling like her typical sickle cell pain.  Pain started last night while sleeping in bed, no trauma, no focal weakness or numbness.  Patient took ibuprofen and oxycodone prior to arrival with minimal relief.  No shortness of breath or cough or chest pains    Exam  No midline bony spinal tenderness  Normal range of motion of bilateral lower extremities  Lungs clear    Assessment/plan  Sickle cell pain crisis  Low suspicion for acutely life-threatening condition, no red flag signs to suggest cord compression or any infectious symptoms  We will do IV Tylenol, Toradol, morphine, labs with reticulocyte count and type and screen  Dispo pending results and symptoms on reevaluation

## 2023-07-03 NOTE — PATIENT PROFILE ADULT - FALL HARM RISK - HARM RISK INTERVENTIONS
Communicate Risk of Fall with Harm to all staff/Tailored Fall Risk Interventions/Bed in lowest position, wheels locked, appropriate side rails in place/Call bell, personal items and telephone in reach/Instruct patient to call for assistance before getting out of bed or chair/Non-slip footwear when patient is out of bed/[place] to call system/Physically safe environment - no spills, clutter or unnecessary equipment/Purposeful Proactive Rounding/Room/bathroom lighting operational, light cord in reach

## 2023-07-03 NOTE — H&P ADULT - ASSESSMENT
Florina is a 18 yo F with Hgb SS admitted for a VOE of her lower back, which was unresponsive to oral pain medications at home, now requiring IV pain medications. She is afebrile and hemodynamically stable.    Heme:  - Hgb lower than baseline at 7.5 but has elevated retic count, will recheck CBC in am  - Restart home HU  - Start Lovenox daily for DVT ppx    FEN/GI  - mild hyponatremia, likely secondary to dehydration  >> IVF @ 1M  - constipation  >> Senna and miralax BID  - Regular diet    Resp  - Restart home inhalers  - on RA  - Incentive spirometry    Neuro/Pain  - Morphine Q3 ATC, titrate as needed  - Toradol Q6 ATC

## 2023-07-03 NOTE — DISCHARGE NOTE PROVIDER - NSDCCPCAREPLAN_GEN_ALL_CORE_FT
PRINCIPAL DISCHARGE DIAGNOSIS  Diagnosis: Acute sickle cell crisis  Assessment and Plan of Treatment: you were admitted to hospital due to sickle cell crisis. You were treated with ______.

## 2023-07-03 NOTE — ED PROVIDER NOTE - OBJECTIVE STATEMENT
17-year-old female with history of sickle cell SS with last admission for sickle cell disease years ago, presenting to ER for lower back pain and pain in bilateral thighs feeling like her typical sickle cell pain.  Pain started last night while sleeping in bed, no trauma, no focal weakness or numbness.  Patient took ibuprofen and oxycodone prior to arrival with minimal relief.  No shortness of breath or cough or chest pains

## 2023-07-03 NOTE — ED ADULT NURSE NOTE - NS ED NURSE RECORD ANOTHER VITAL SIGN
head/facial injury, raccoon eyes, left wrist inj, s/p mech fall yesterday, last ASA 81 mg on 3/18, no loc Yes

## 2023-07-03 NOTE — H&P ADULT - NSHPLABSRESULTS_GEN_ALL_CORE
CBC Full  -  ( 03 Jul 2023 07:00 )  WBC Count : 12.40 K/uL  RBC Count : 2.10 M/uL  Hemoglobin : 7.5 g/dL  Hematocrit : 20.4 %  Platelet Count - Automated : 362 K/uL  Mean Cell Volume : 97.1 fL  Mean Cell Hemoglobin : 35.7 pg  Mean Cell Hemoglobin Concentration : 36.8 gm/dL  Auto Neutrophil # : 8.55 K/uL  Auto Lymphocyte # : 2.22 K/uL  Auto Monocyte # : 1.01 K/uL  Auto Eosinophil # : 0.11 K/uL  Auto Basophil # : 0.10 K/uL  Auto Neutrophil % : 64.3 %  Auto Lymphocyte % : 19.1 %  Auto Monocyte % : 9.6 %  Auto Eosinophil % : 1.7 %  Auto Basophil % : 0.8 %    07-03    131<L>  |  97<L>  |  10  ----------------------------<  110<H>  4.2   |  18<L>  |  0.64    Ca    9.4      03 Jul 2023 07:00  Phos  4.2     07-03  Mg     1.90     07-03

## 2023-07-03 NOTE — H&P ADULT - NSHPREVIEWOFSYSTEMS_GEN_ALL_CORE
REVIEW OF SYSTEMS:    CONSTITUTIONAL:  No weakness, fevers or chills  EYES/ENT:  No visual changes;  No vertigo or throat pain   NECK:  No pain or stiffness  RESPIRATORY:  No cough, wheezing, hemoptysis; No shortness of breath  CARDIOVASCULAR:  No chest pain or palpitations  GASTROINTESTINAL:  No abdominal or epigastric pain. No nausea, vomiting, or hematemesis; No diarrhea or constipation. No melena or hematochezia.  GENITOURINARY:  No dysuria, frequency or hematuria  MUSCULOSKELETAL: + back pain, FROM all extremities, normal strength, No calf tenderness  NEUROLOGICAL:  No numbness or weakness  SKIN:  No itching, rashes

## 2023-07-03 NOTE — ED ADULT NURSE NOTE - OBJECTIVE STATEMENT
Pt arrives to room 2 A&Ox4, ambulatory, on room air coming to ED c/o lower back pain. Pt history of sickle cell (last hospitalization x 4 years ago). Pt endorses pain began around midnight in her lower back and worsened. Pt states 10/10 pain. Pt denies chest pain, HA, SOB, N/V/D, fever/chills. Respirations even and unlabored, chest rise and fall equal b/l. Abdomen soft and nontender. Pending urine sample. Right 20g placed, labs drawn and sent to lab. Medicated per EMAR. safety maintained.

## 2023-07-03 NOTE — CHART NOTE - NSCHARTNOTEFT_GEN_A_CORE
Spoke to Peds fellow Dr Jacob Doty, recommending to give Toradol 30mg IVP Q 6hrs and Morphine 4 mg Q 3 hrs until PCA pump is set up. Also she wants patient transferred to Peds service of Dr Naz Koo.
[employer] [facility] [employer].    RVP Neg-- to be transferred over to [facility]  Sign out given to provider at [phone].    Lilly Elmore PA  Medicine [phone]

## 2023-07-04 LAB
ANION GAP SERPL CALC-SCNC: 12 MMOL/L — SIGNIFICANT CHANGE UP (ref 7–14)
ANISOCYTOSIS BLD QL: SIGNIFICANT CHANGE UP
BASOPHILS # BLD AUTO: 0 K/UL — SIGNIFICANT CHANGE UP (ref 0–0.2)
BASOPHILS NFR BLD AUTO: 0 % — SIGNIFICANT CHANGE UP (ref 0–2)
BUN SERPL-MCNC: 7 MG/DL — SIGNIFICANT CHANGE UP (ref 7–23)
C DIFF BY PCR RESULT: SIGNIFICANT CHANGE UP
CALCIUM SERPL-MCNC: 9.3 MG/DL — SIGNIFICANT CHANGE UP (ref 8.4–10.5)
CHLORIDE SERPL-SCNC: 103 MMOL/L — SIGNIFICANT CHANGE UP (ref 98–107)
CO2 SERPL-SCNC: 19 MMOL/L — LOW (ref 22–31)
CREAT SERPL-MCNC: 0.56 MG/DL — SIGNIFICANT CHANGE UP (ref 0.5–1.3)
EGFR: 135 ML/MIN/1.73M2 — SIGNIFICANT CHANGE UP
EOSINOPHIL # BLD AUTO: 0.09 K/UL — SIGNIFICANT CHANGE UP (ref 0–0.5)
EOSINOPHIL NFR BLD AUTO: 0.9 % — SIGNIFICANT CHANGE UP (ref 0–6)
GIANT PLATELETS BLD QL SMEAR: PRESENT — SIGNIFICANT CHANGE UP
GLUCOSE SERPL-MCNC: 107 MG/DL — HIGH (ref 70–99)
HCT VFR BLD CALC: 19.3 % — CRITICAL LOW (ref 34.5–45)
HGB BLD-MCNC: 7.1 G/DL — LOW (ref 11.5–15.5)
IANC: 7.3 K/UL — SIGNIFICANT CHANGE UP (ref 1.8–7.4)
LYMPHOCYTES # BLD AUTO: 1.74 K/UL — SIGNIFICANT CHANGE UP (ref 1–3.3)
LYMPHOCYTES # BLD AUTO: 18.4 % — SIGNIFICANT CHANGE UP (ref 13–44)
MACROCYTES BLD QL: SIGNIFICANT CHANGE UP
MAGNESIUM SERPL-MCNC: 1.9 MG/DL — SIGNIFICANT CHANGE UP (ref 1.6–2.6)
MCHC RBC-ENTMCNC: 35.9 PG — HIGH (ref 27–34)
MCHC RBC-ENTMCNC: 36.8 GM/DL — HIGH (ref 32–36)
MCV RBC AUTO: 97.5 FL — SIGNIFICANT CHANGE UP (ref 80–100)
METAMYELOCYTES # FLD: 0.9 % — SIGNIFICANT CHANGE UP (ref 0–1)
MICROCYTES BLD QL: SLIGHT — SIGNIFICANT CHANGE UP
MONOCYTES # BLD AUTO: 0.17 K/UL — SIGNIFICANT CHANGE UP (ref 0–0.9)
MONOCYTES NFR BLD AUTO: 1.8 % — LOW (ref 2–14)
NEUTROPHILS # BLD AUTO: 7.38 K/UL — SIGNIFICANT CHANGE UP (ref 1.8–7.4)
NEUTROPHILS NFR BLD AUTO: 71.9 % — SIGNIFICANT CHANGE UP (ref 43–77)
NEUTS BAND # BLD: 6.1 % — HIGH (ref 0–6)
NRBC # BLD: 5 /100 — HIGH (ref 0–0)
PHOSPHATE SERPL-MCNC: 3.4 MG/DL — SIGNIFICANT CHANGE UP (ref 2.5–4.5)
PLAT MORPH BLD: NORMAL — SIGNIFICANT CHANGE UP
PLATELET # BLD AUTO: 353 K/UL — SIGNIFICANT CHANGE UP (ref 150–400)
PLATELET COUNT - ESTIMATE: NORMAL — SIGNIFICANT CHANGE UP
POIKILOCYTOSIS BLD QL AUTO: SIGNIFICANT CHANGE UP
POLYCHROMASIA BLD QL SMEAR: SIGNIFICANT CHANGE UP
POTASSIUM SERPL-MCNC: 4.3 MMOL/L — SIGNIFICANT CHANGE UP (ref 3.5–5.3)
POTASSIUM SERPL-SCNC: 4.3 MMOL/L — SIGNIFICANT CHANGE UP (ref 3.5–5.3)
RBC # BLD: 1.98 M/UL — LOW (ref 3.8–5.2)
RBC # BLD: 1.98 M/UL — LOW (ref 3.8–5.2)
RBC # FLD: 20.8 % — HIGH (ref 10.3–14.5)
RBC BLD AUTO: NORMAL — SIGNIFICANT CHANGE UP
RETICS #: 174.6 K/UL — HIGH (ref 25–125)
RETICS/RBC NFR: 8.8 % — HIGH (ref 0.5–2.5)
SCHISTOCYTES BLD QL AUTO: SIGNIFICANT CHANGE UP
SODIUM SERPL-SCNC: 134 MMOL/L — LOW (ref 135–145)
SPHEROCYTES BLD QL SMEAR: SLIGHT — SIGNIFICANT CHANGE UP
TARGETS BLD QL SMEAR: SLIGHT — SIGNIFICANT CHANGE UP
WBC # BLD: 9.46 K/UL — SIGNIFICANT CHANGE UP (ref 3.8–10.5)
WBC # FLD AUTO: 9.46 K/UL — SIGNIFICANT CHANGE UP (ref 3.8–10.5)

## 2023-07-04 PROCEDURE — 99233 SBSQ HOSP IP/OBS HIGH 50: CPT

## 2023-07-04 RX ORDER — HYDROXYUREA 500 MG/1
1500 CAPSULE ORAL DAILY
Refills: 0 | Status: DISCONTINUED | OUTPATIENT
Start: 2023-07-04 | End: 2023-07-06

## 2023-07-04 RX ORDER — MORPHINE SULFATE 50 MG/1
6 CAPSULE, EXTENDED RELEASE ORAL
Refills: 0 | Status: DISCONTINUED | OUTPATIENT
Start: 2023-07-04 | End: 2023-07-05

## 2023-07-04 RX ORDER — MORPHINE SULFATE 50 MG/1
7.5 CAPSULE, EXTENDED RELEASE ORAL
Refills: 0 | Status: DISCONTINUED | OUTPATIENT
Start: 2023-07-04 | End: 2023-07-04

## 2023-07-04 RX ADMIN — MORPHINE SULFATE 6 MILLIGRAM(S): 50 CAPSULE, EXTENDED RELEASE ORAL at 17:10

## 2023-07-04 RX ADMIN — FAMOTIDINE 20 MILLIGRAM(S): 10 INJECTION INTRAVENOUS at 22:04

## 2023-07-04 RX ADMIN — DEXTROSE MONOHYDRATE, SODIUM CHLORIDE, AND POTASSIUM CHLORIDE 90 MILLILITER(S): 50; .745; 4.5 INJECTION, SOLUTION INTRAVENOUS at 19:27

## 2023-07-04 RX ADMIN — Medication 24 MILLIGRAM(S): at 19:20

## 2023-07-04 RX ADMIN — Medication 24 MILLIGRAM(S): at 13:30

## 2023-07-04 RX ADMIN — POLYETHYLENE GLYCOL 3350 17 GRAM(S): 17 POWDER, FOR SOLUTION ORAL at 22:04

## 2023-07-04 RX ADMIN — Medication 24 MILLIGRAM(S): at 06:41

## 2023-07-04 RX ADMIN — MORPHINE SULFATE 15 MILLIGRAM(S): 50 CAPSULE, EXTENDED RELEASE ORAL at 13:50

## 2023-07-04 RX ADMIN — Medication 1 MILLIGRAM(S): at 10:40

## 2023-07-04 RX ADMIN — FAMOTIDINE 20 MILLIGRAM(S): 10 INJECTION INTRAVENOUS at 10:39

## 2023-07-04 RX ADMIN — POLYETHYLENE GLYCOL 3350 17 GRAM(S): 17 POWDER, FOR SOLUTION ORAL at 10:40

## 2023-07-04 RX ADMIN — Medication 24 MILLIGRAM(S): at 00:40

## 2023-07-04 RX ADMIN — MORPHINE SULFATE 10 MILLIGRAM(S): 50 CAPSULE, EXTENDED RELEASE ORAL at 01:06

## 2023-07-04 RX ADMIN — MORPHINE SULFATE 12 MILLIGRAM(S): 50 CAPSULE, EXTENDED RELEASE ORAL at 22:58

## 2023-07-04 RX ADMIN — MORPHINE SULFATE 5 MILLIGRAM(S): 50 CAPSULE, EXTENDED RELEASE ORAL at 01:30

## 2023-07-04 RX ADMIN — MORPHINE SULFATE 10 MILLIGRAM(S): 50 CAPSULE, EXTENDED RELEASE ORAL at 04:05

## 2023-07-04 RX ADMIN — MORPHINE SULFATE 6 MILLIGRAM(S): 50 CAPSULE, EXTENDED RELEASE ORAL at 20:25

## 2023-07-04 RX ADMIN — MORPHINE SULFATE 10 MILLIGRAM(S): 50 CAPSULE, EXTENDED RELEASE ORAL at 06:58

## 2023-07-04 RX ADMIN — MORPHINE SULFATE 7.5 MILLIGRAM(S): 50 CAPSULE, EXTENDED RELEASE ORAL at 11:30

## 2023-07-04 RX ADMIN — MORPHINE SULFATE 5 MILLIGRAM(S): 50 CAPSULE, EXTENDED RELEASE ORAL at 04:30

## 2023-07-04 RX ADMIN — MORPHINE SULFATE 12 MILLIGRAM(S): 50 CAPSULE, EXTENDED RELEASE ORAL at 19:52

## 2023-07-04 RX ADMIN — MORPHINE SULFATE 5 MILLIGRAM(S): 50 CAPSULE, EXTENDED RELEASE ORAL at 07:20

## 2023-07-04 RX ADMIN — MORPHINE SULFATE 12 MILLIGRAM(S): 50 CAPSULE, EXTENDED RELEASE ORAL at 16:38

## 2023-07-04 RX ADMIN — Medication 1 PUFF(S): at 19:35

## 2023-07-04 RX ADMIN — Medication 24 MILLIGRAM(S): at 18:50

## 2023-07-04 RX ADMIN — DEXTROSE MONOHYDRATE, SODIUM CHLORIDE, AND POTASSIUM CHLORIDE 90 MILLILITER(S): 50; .745; 4.5 INJECTION, SOLUTION INTRAVENOUS at 07:21

## 2023-07-04 RX ADMIN — HYDROXYUREA 1500 MILLIGRAM(S): 500 CAPSULE ORAL at 22:02

## 2023-07-04 RX ADMIN — Medication 24 MILLIGRAM(S): at 06:57

## 2023-07-04 RX ADMIN — SENNA PLUS 1 TABLET(S): 8.6 TABLET ORAL at 22:04

## 2023-07-04 RX ADMIN — SENNA PLUS 1 TABLET(S): 8.6 TABLET ORAL at 10:40

## 2023-07-04 RX ADMIN — Medication 1 PUFF(S): at 09:09

## 2023-07-04 RX ADMIN — MORPHINE SULFATE 6 MILLIGRAM(S): 50 CAPSULE, EXTENDED RELEASE ORAL at 23:18

## 2023-07-04 RX ADMIN — Medication 24 MILLIGRAM(S): at 12:53

## 2023-07-04 RX ADMIN — Medication 1000 UNIT(S): at 10:40

## 2023-07-04 RX ADMIN — ENOXAPARIN SODIUM 40 MILLIGRAM(S): 100 INJECTION SUBCUTANEOUS at 10:40

## 2023-07-04 RX ADMIN — Medication 24 MILLIGRAM(S): at 01:00

## 2023-07-04 RX ADMIN — MORPHINE SULFATE 15 MILLIGRAM(S): 50 CAPSULE, EXTENDED RELEASE ORAL at 10:47

## 2023-07-04 NOTE — PROGRESS NOTE PEDS - ASSESSMENT
Florina is a 18 yo F with Hgb SS admitted for a VOE of her lower back, which was unresponsive to oral pain medications at home, now requiring IV pain medications. She is afebrile and hemodynamically stable.    Increased morphine from 5mg to 7.5mg intially today, but excess sedation so decreased to 6mg q3h with adequate pain control. Will wean as tolerated    Heme:  - Continue hydroxyurea  - Lovenox daily for DVT ppx  - recheck CBC in AM (hemoglobin 7.5 --> 7.1 today)    FEN/GI  >> IVF @ 1M  - constipation  >> Senna and miralax BID  - Regular diet    Resp  - Restart home inhalers  - on RA  - Incentive spirometry    Neuro/Pain  - Morphine 6mg Q3 ATC, titrate as needed  - Toradol Q6 ATC

## 2023-07-05 LAB
ANISOCYTOSIS BLD QL: SIGNIFICANT CHANGE UP
B PERT DNA SPEC QL NAA+PROBE: SIGNIFICANT CHANGE UP
B PERT+PARAPERT DNA PNL SPEC NAA+PROBE: SIGNIFICANT CHANGE UP
BASOPHILS # BLD AUTO: 0 K/UL — SIGNIFICANT CHANGE UP (ref 0–0.2)
BASOPHILS NFR BLD AUTO: 0 % — SIGNIFICANT CHANGE UP (ref 0–2)
BORDETELLA PARAPERTUSSIS (RAPRVP): SIGNIFICANT CHANGE UP
C PNEUM DNA SPEC QL NAA+PROBE: SIGNIFICANT CHANGE UP
ELLIPTOCYTES BLD QL SMEAR: SLIGHT — SIGNIFICANT CHANGE UP
EOSINOPHIL # BLD AUTO: 0.19 K/UL — SIGNIFICANT CHANGE UP (ref 0–0.5)
EOSINOPHIL NFR BLD AUTO: 1.8 % — SIGNIFICANT CHANGE UP (ref 0–6)
FLUAV SUBTYP SPEC NAA+PROBE: SIGNIFICANT CHANGE UP
FLUBV RNA SPEC QL NAA+PROBE: SIGNIFICANT CHANGE UP
GIANT PLATELETS BLD QL SMEAR: PRESENT — SIGNIFICANT CHANGE UP
HADV DNA SPEC QL NAA+PROBE: SIGNIFICANT CHANGE UP
HCOV 229E RNA SPEC QL NAA+PROBE: SIGNIFICANT CHANGE UP
HCOV HKU1 RNA SPEC QL NAA+PROBE: SIGNIFICANT CHANGE UP
HCOV NL63 RNA SPEC QL NAA+PROBE: SIGNIFICANT CHANGE UP
HCOV OC43 RNA SPEC QL NAA+PROBE: SIGNIFICANT CHANGE UP
HCT VFR BLD CALC: 19.2 % — CRITICAL LOW (ref 34.5–45)
HEMOGLOBIN INTERPRETATION: SIGNIFICANT CHANGE UP
HGB A MFR BLD: 0 % — LOW (ref 95–97.6)
HGB A2 MFR BLD: 3.8 % — HIGH (ref 2.4–3.5)
HGB BLD-MCNC: 7.1 G/DL — LOW (ref 11.5–15.5)
HGB F MFR BLD: 13.2 % — HIGH (ref 0–1.5)
HGB S MFR BLD: 83 % — HIGH
HMPV RNA SPEC QL NAA+PROBE: SIGNIFICANT CHANGE UP
HPIV1 RNA SPEC QL NAA+PROBE: SIGNIFICANT CHANGE UP
HPIV2 RNA SPEC QL NAA+PROBE: SIGNIFICANT CHANGE UP
HPIV3 RNA SPEC QL NAA+PROBE: SIGNIFICANT CHANGE UP
HPIV4 RNA SPEC QL NAA+PROBE: SIGNIFICANT CHANGE UP
IANC: 6.83 K/UL — SIGNIFICANT CHANGE UP (ref 1.8–7.4)
LYMPHOCYTES # BLD AUTO: 19.6 % — SIGNIFICANT CHANGE UP (ref 13–44)
LYMPHOCYTES # BLD AUTO: 2.01 K/UL — SIGNIFICANT CHANGE UP (ref 1–3.3)
M PNEUMO DNA SPEC QL NAA+PROBE: SIGNIFICANT CHANGE UP
MACROCYTES BLD QL: SIGNIFICANT CHANGE UP
MANUAL SMEAR VERIFICATION: SIGNIFICANT CHANGE UP
MCHC RBC-ENTMCNC: 36 PG — HIGH (ref 27–34)
MCHC RBC-ENTMCNC: 37 GM/DL — HIGH (ref 32–36)
MCV RBC AUTO: 97.5 FL — SIGNIFICANT CHANGE UP (ref 80–100)
MICROCYTES BLD QL: SLIGHT — SIGNIFICANT CHANGE UP
MONOCYTES # BLD AUTO: 0.64 K/UL — SIGNIFICANT CHANGE UP (ref 0–0.9)
MONOCYTES NFR BLD AUTO: 6.2 % — SIGNIFICANT CHANGE UP (ref 2–14)
MRSA PCR RESULT.: SIGNIFICANT CHANGE UP
NEUTROPHILS # BLD AUTO: 6.89 K/UL — SIGNIFICANT CHANGE UP (ref 1.8–7.4)
NEUTROPHILS NFR BLD AUTO: 60.7 % — SIGNIFICANT CHANGE UP (ref 43–77)
NEUTS BAND # BLD: 6.3 % — HIGH (ref 0–6)
NRBC # BLD: 15 /100 — HIGH (ref 0–0)
OVALOCYTES BLD QL SMEAR: SLIGHT — SIGNIFICANT CHANGE UP
PLAT MORPH BLD: NORMAL — SIGNIFICANT CHANGE UP
PLATELET # BLD AUTO: 305 K/UL — SIGNIFICANT CHANGE UP (ref 150–400)
PLATELET COUNT - ESTIMATE: NORMAL — SIGNIFICANT CHANGE UP
POIKILOCYTOSIS BLD QL AUTO: SIGNIFICANT CHANGE UP
POLYCHROMASIA BLD QL SMEAR: SLIGHT — SIGNIFICANT CHANGE UP
RAPID RVP RESULT: SIGNIFICANT CHANGE UP
RBC # BLD: 1.97 M/UL — LOW (ref 3.8–5.2)
RBC # BLD: 1.97 M/UL — LOW (ref 3.8–5.2)
RBC # FLD: 21 % — HIGH (ref 10.3–14.5)
RBC BLD AUTO: NORMAL — SIGNIFICANT CHANGE UP
RETICS #: 201.7 K/UL — HIGH (ref 25–125)
RETICS/RBC NFR: 10.2 % — HIGH (ref 0.5–2.5)
RSV RNA SPEC QL NAA+PROBE: SIGNIFICANT CHANGE UP
RV+EV RNA SPEC QL NAA+PROBE: SIGNIFICANT CHANGE UP
S AUREUS DNA NOSE QL NAA+PROBE: DETECTED
SARS-COV-2 RNA SPEC QL NAA+PROBE: SIGNIFICANT CHANGE UP
SCHISTOCYTES BLD QL AUTO: SIGNIFICANT CHANGE UP
VARIANT LYMPHS # BLD: 5.4 % — SIGNIFICANT CHANGE UP (ref 0–6)
WBC # BLD: 10.28 K/UL — SIGNIFICANT CHANGE UP (ref 3.8–10.5)
WBC # FLD AUTO: 10.28 K/UL — SIGNIFICANT CHANGE UP (ref 3.8–10.5)

## 2023-07-05 PROCEDURE — 99232 SBSQ HOSP IP/OBS MODERATE 35: CPT | Mod: GC

## 2023-07-05 PROCEDURE — 71045 X-RAY EXAM CHEST 1 VIEW: CPT | Mod: 26

## 2023-07-05 RX ORDER — OXYCODONE HYDROCHLORIDE 5 MG/1
9 TABLET ORAL EVERY 4 HOURS
Refills: 0 | Status: DISCONTINUED | OUTPATIENT
Start: 2023-07-05 | End: 2023-07-06

## 2023-07-05 RX ORDER — MORPHINE SULFATE 50 MG/1
6 CAPSULE, EXTENDED RELEASE ORAL EVERY 4 HOURS
Refills: 0 | Status: DISCONTINUED | OUTPATIENT
Start: 2023-07-05 | End: 2023-07-05

## 2023-07-05 RX ORDER — OXYCODONE HYDROCHLORIDE 5 MG/1
9 TABLET ORAL EVERY 4 HOURS
Refills: 0 | Status: DISCONTINUED | OUTPATIENT
Start: 2023-07-05 | End: 2023-07-05

## 2023-07-05 RX ORDER — IBUPROFEN 200 MG
600 TABLET ORAL EVERY 6 HOURS
Refills: 0 | Status: DISCONTINUED | OUTPATIENT
Start: 2023-07-05 | End: 2023-07-06

## 2023-07-05 RX ORDER — CEFTRIAXONE 500 MG/1
2000 INJECTION, POWDER, FOR SOLUTION INTRAMUSCULAR; INTRAVENOUS EVERY 24 HOURS
Refills: 0 | Status: DISCONTINUED | OUTPATIENT
Start: 2023-07-05 | End: 2023-07-06

## 2023-07-05 RX ORDER — LACTULOSE 10 G/15ML
30 SOLUTION ORAL ONCE
Refills: 0 | Status: COMPLETED | OUTPATIENT
Start: 2023-07-05 | End: 2023-07-06

## 2023-07-05 RX ADMIN — MORPHINE SULFATE 6 MILLIGRAM(S): 50 CAPSULE, EXTENDED RELEASE ORAL at 02:25

## 2023-07-05 RX ADMIN — MORPHINE SULFATE 12 MILLIGRAM(S): 50 CAPSULE, EXTENDED RELEASE ORAL at 02:05

## 2023-07-05 RX ADMIN — MORPHINE SULFATE 12 MILLIGRAM(S): 50 CAPSULE, EXTENDED RELEASE ORAL at 08:07

## 2023-07-05 RX ADMIN — CEFTRIAXONE 100 MILLIGRAM(S): 500 INJECTION, POWDER, FOR SOLUTION INTRAMUSCULAR; INTRAVENOUS at 08:33

## 2023-07-05 RX ADMIN — Medication 1 MILLIGRAM(S): at 10:28

## 2023-07-05 RX ADMIN — MORPHINE SULFATE 6 MILLIGRAM(S): 50 CAPSULE, EXTENDED RELEASE ORAL at 12:14

## 2023-07-05 RX ADMIN — OXYCODONE HYDROCHLORIDE 9 MILLIGRAM(S): 5 TABLET ORAL at 19:48

## 2023-07-05 RX ADMIN — Medication 1000 UNIT(S): at 10:27

## 2023-07-05 RX ADMIN — SENNA PLUS 1 TABLET(S): 8.6 TABLET ORAL at 10:27

## 2023-07-05 RX ADMIN — FAMOTIDINE 20 MILLIGRAM(S): 10 INJECTION INTRAVENOUS at 22:05

## 2023-07-05 RX ADMIN — OXYCODONE HYDROCHLORIDE 9 MILLIGRAM(S): 5 TABLET ORAL at 23:06

## 2023-07-05 RX ADMIN — OXYCODONE HYDROCHLORIDE 9 MILLIGRAM(S): 5 TABLET ORAL at 19:41

## 2023-07-05 RX ADMIN — Medication 24 MILLIGRAM(S): at 14:00

## 2023-07-05 RX ADMIN — Medication 600 MILLIGRAM(S): at 19:48

## 2023-07-05 RX ADMIN — Medication 1 PUFF(S): at 19:17

## 2023-07-05 RX ADMIN — SENNA PLUS 1 TABLET(S): 8.6 TABLET ORAL at 22:05

## 2023-07-05 RX ADMIN — POLYETHYLENE GLYCOL 3350 17 GRAM(S): 17 POWDER, FOR SOLUTION ORAL at 22:05

## 2023-07-05 RX ADMIN — MORPHINE SULFATE 6 MILLIGRAM(S): 50 CAPSULE, EXTENDED RELEASE ORAL at 05:37

## 2023-07-05 RX ADMIN — FAMOTIDINE 20 MILLIGRAM(S): 10 INJECTION INTRAVENOUS at 10:28

## 2023-07-05 RX ADMIN — Medication 24 MILLIGRAM(S): at 07:25

## 2023-07-05 RX ADMIN — Medication 24 MILLIGRAM(S): at 01:20

## 2023-07-05 RX ADMIN — Medication 24 MILLIGRAM(S): at 13:08

## 2023-07-05 RX ADMIN — OXYCODONE HYDROCHLORIDE 9 MILLIGRAM(S): 5 TABLET ORAL at 23:17

## 2023-07-05 RX ADMIN — MORPHINE SULFATE 12 MILLIGRAM(S): 50 CAPSULE, EXTENDED RELEASE ORAL at 11:12

## 2023-07-05 RX ADMIN — POLYETHYLENE GLYCOL 3350 17 GRAM(S): 17 POWDER, FOR SOLUTION ORAL at 10:26

## 2023-07-05 RX ADMIN — MORPHINE SULFATE 6 MILLIGRAM(S): 50 CAPSULE, EXTENDED RELEASE ORAL at 09:00

## 2023-07-05 RX ADMIN — MORPHINE SULFATE 6 MILLIGRAM(S): 50 CAPSULE, EXTENDED RELEASE ORAL at 16:13

## 2023-07-05 RX ADMIN — Medication 1 PUFF(S): at 09:48

## 2023-07-05 RX ADMIN — Medication 600 MILLIGRAM(S): at 19:02

## 2023-07-05 RX ADMIN — Medication 24 MILLIGRAM(S): at 07:05

## 2023-07-05 RX ADMIN — DEXTROSE MONOHYDRATE, SODIUM CHLORIDE, AND POTASSIUM CHLORIDE 90 MILLILITER(S): 50; .745; 4.5 INJECTION, SOLUTION INTRAVENOUS at 07:19

## 2023-07-05 RX ADMIN — ENOXAPARIN SODIUM 40 MILLIGRAM(S): 100 INJECTION SUBCUTANEOUS at 10:26

## 2023-07-05 RX ADMIN — DEXTROSE MONOHYDRATE, SODIUM CHLORIDE, AND POTASSIUM CHLORIDE 90 MILLILITER(S): 50; .745; 4.5 INJECTION, SOLUTION INTRAVENOUS at 19:35

## 2023-07-05 RX ADMIN — HYDROXYUREA 1500 MILLIGRAM(S): 500 CAPSULE ORAL at 21:50

## 2023-07-05 RX ADMIN — MORPHINE SULFATE 12 MILLIGRAM(S): 50 CAPSULE, EXTENDED RELEASE ORAL at 05:11

## 2023-07-05 RX ADMIN — MORPHINE SULFATE 12 MILLIGRAM(S): 50 CAPSULE, EXTENDED RELEASE ORAL at 15:31

## 2023-07-05 RX ADMIN — Medication 24 MILLIGRAM(S): at 01:00

## 2023-07-05 NOTE — PROGRESS NOTE PEDS - ATTENDING COMMENTS
Florina was admitted 3 days ago for VOE of her lower back likely triggered by menses and was doing well and being weaned off her pain meds when she developed a fever - 102 after which she had a blood culture drawn and started on ceftriaxone, RVP- negative, CXR - negative for infiltrates ; comfortable in bed, no de sats / reps distress; recommend await blood culture results and if afebrile x 24 hrs consider d/c home, wean pain meds further in preparation for discharge
Implemented All Universal Safety Interventions:  [place] to call system. Call bell, personal items and telephone within reach. Instruct patient to call for assistance. Room bathroom lighting operational. Non-slip footwear when patient is off stretcher. Physically safe environment: no spills, clutter or unnecessary equipment. Stretcher in lowest position, wheels locked, appropriate side rails in place.

## 2023-07-05 NOTE — PROGRESS NOTE PEDS - SUBJECTIVE AND OBJECTIVE BOX
INTERVAL/OVERNIGHT EVENTS: No acute events overnight; spiked fever at 7am. Remained on morphine q3hr    [x] Family Centered Rounds Completed.     MEDICATIONS  (STANDING):  cefTRIAXone IV Intermittent - Peds 2000 milliGRAM(s) IV Intermittent every 24 hours  cholecalciferol Oral Tab/Cap - Peds 1000 Unit(s) Oral daily  dextrose 5% + sodium chloride 0.45% with potassium chloride 20 mEq/L. - Pediatric 1000 milliLiter(s) (90 mL/Hr) IV Continuous <Continuous>  enoxaparin SubCutaneous Injection - Peds 40 milliGRAM(s) SubCutaneous daily  famotidine  Oral Tab/Cap - Peds 20 milliGRAM(s) Oral two times a day  fluticasone  propionate  44 MICROgram(s) HFA Inhaler - Peds 1 Puff(s) Inhalation two times a day  folic acid  Oral Tab/Cap - Peds 1 milliGRAM(s) Oral daily  hydroxyurea Oral Tab/Cap - Peds 1500 milliGRAM(s) Oral daily  ketorolac IV Push - Peds. 24 milliGRAM(s) IV Push every 6 hours  morphine  IV Intermittent - Peds 6 milliGRAM(s) IV Intermittent every 3 hours  polyethylene glycol 3350 Oral Powder - Peds 17 Gram(s) Oral two times a day  senna 8.6 milliGRAM(s) Oral Tablet - Peds 1 Tablet(s) Oral two times a day    MEDICATIONS  (PRN):  albuterol  90 MICROgram(s) HFA Inhaler - Peds 2 Puff(s) Inhalation every 4 hours PRN Shortness of Breath and/or Wheezing    Allergies    No Known Allergies    Intolerances        Diet:    [x] There are no updates to the medical, surgical, social or family history unless described:    PATIENT CARE ACCESS DEVICES  [x] Peripheral IV  [ ] Central Venous Line, Date Placed:		Site/Device:  [ ] PICC, Date Placed:  [ ] Urinary Catheter, Date Placed:  [ ] Necessity of urinary, arterial, and venous catheters discussed    Review of Systems: If not negative (Neg) please elaborate. History Per:   General: [X] Neg  Pulmonary: [X] Neg  Cardiac: [X] Neg  Gastrointestinal: [X ] Neg  Ears, Nose, Throat: [X] Neg  Renal/Urologic: [X] Neg  Musculoskeletal: [X] Neg  Endocrine: [X] Neg  Hematologic: [X] Neg  Neurologic: [X] Neg  Allergy/Immunologic: [X] Neg  All other systems reviewed and negative [X]     Vital Signs Last 24 Hrs  T(C): 38.7 (05 Jul 2023 06:54), Max: 38.7 (05 Jul 2023 06:54)  T(F): 101.6 (05 Jul 2023 06:54), Max: 101.6 (05 Jul 2023 06:54)  HR: 118 (05 Jul 2023 06:54) (94 - 118)  BP: 94/52 (05 Jul 2023 06:54) (94/52 - 119/62)  BP(mean): --  RR: 20 (05 Jul 2023 06:54) (18 - 20)  SpO2: 100% (05 Jul 2023 06:54) (95% - 100%)    Parameters below as of 05 Jul 2023 06:54  Patient On (Oxygen Delivery Method): room air      I&O's Summary    04 Jul 2023 07:01  -  05 Jul 2023 07:00  --------------------------------------------------------  IN: 2873.6 mL / OUT: 400 mL / NET: 2473.6 mL        Daily Weight Gm: 19529 (03 Jul 2023 21:31)  BMI (kg/m2): 112.2 (07-03 @ 21:31)    I examined the patient during Family Centered rounds with mother/father present at bedside  VS reviewed, stable.  Gen: patient is well appearing, no acute distress  HEENT: NC/AT, pupils equal, responsive, reactive to light and accomodation, no conjunctivitis or scleral icterus; no nasal discharge or congestion. OP without exudates/erythema.   Neck: FROM, supple, no cervical LAD  Chest: CTA b/l, no crackles/wheezes, good air entry, no tachypnea or retractions  CV: regular rate and rhythm, no murmurs   Abd: soft, nontender, nondistended, no HSM appreciated, +BS  : normal external genitalia  Back: no vertebral or paraspinal tenderness along entire spine; no CVAT  Extrem: No joint effusion or tenderness; FROM of all joints; no deformities or erythema noted. 2+ peripheral pulses, WWP.   Neuro: CN II-XII intact--did not test visual acuity. Strength in B/L UEs and LEs 5/5; sensation intact and equal in b/l LEs and b/l UEs. Gait wnl. Patellar DTRs 2+ b/l    Interval Lab Results:                        7.1    9.46  )-----------( 353      ( 04 Jul 2023 08:38 )             19.3                         7.5    12.40 )-----------( 362      ( 03 Jul 2023 07:00 )             20.4                               134    |  103    |  7                   Calcium: 9.3   / iCa: x      (07-04 @ 08:38)    ----------------------------<  107       Magnesium: 1.90                             4.3     |  19     |  0.56             Phosphorous: 3.4        Urinalysis Basic - ( 04 Jul 2023 08:38 )    Color: x / Appearance: x / SG: x / pH: x  Gluc: 107 mg/dL / Ketone: x  / Bili: x / Urobili: x   Blood: x / Protein: x / Nitrite: x   Leuk Esterase: x / RBC: x / WBC x   Sq Epi: x / Non Sq Epi: x / Bacteria: x        INTERVAL IMAGING STUDIES:  
Interval history: pain improved overnight, but still 6-7/10. Increased morphine to 6mg q3h    Review of Systems:  General: no fevers, chills, fatigue  HEENT: no runny nose, sore throat, mouth sores  Resp: no cough, SOB  CV: no cyanosis  GI: no N/V/D, no abdominal pain  : no dysuria, no hematuria  MSK:+low back pain  Heme/Lymph: no abnormal bleeding  Skin: no rash     Allergies    No Known Allergies    Intolerances        MEDICATIONS  (STANDING):  cholecalciferol Oral Tab/Cap - Peds 1000 Unit(s) Oral daily  dextrose 5% + sodium chloride 0.45% with potassium chloride 20 mEq/L. - Pediatric 1000 milliLiter(s) (90 mL/Hr) IV Continuous <Continuous>  enoxaparin SubCutaneous Injection - Peds 40 milliGRAM(s) SubCutaneous daily  famotidine  Oral Tab/Cap - Peds 20 milliGRAM(s) Oral two times a day  fluticasone  propionate  44 MICROgram(s) HFA Inhaler - Peds 1 Puff(s) Inhalation two times a day  folic acid  Oral Tab/Cap - Peds 1 milliGRAM(s) Oral daily  hydroxyurea Oral Tab/Cap - Peds 1500 milliGRAM(s) Oral daily  ketorolac IV Push - Peds. 24 milliGRAM(s) IV Push every 6 hours  morphine  IV Intermittent - Peds 6 milliGRAM(s) IV Intermittent every 3 hours  polyethylene glycol 3350 Oral Powder - Peds 17 Gram(s) Oral two times a day  senna 8.6 milliGRAM(s) Oral Tablet - Peds 1 Tablet(s) Oral two times a day    MEDICATIONS  (PRN):  albuterol  90 MICROgram(s) HFA Inhaler - Peds 2 Puff(s) Inhalation every 4 hours PRN Shortness of Breath and/or Wheezing        PATIENT CARE ACCESS  [] Peripheral IV  [] Central Venous Line	  [] PICC, Date Placed:		  [] Broviac – __ Lumen, Date Placed:  [] Mediport, Date Placed:		  [] Urinary Catheter, Date Placed:  [x] Necessity of urinary, arterial, and venous catheters discussed    Vital Signs Last 24 Hrs  T(C): 37 (04 Jul 2023 14:32), Max: 38.1 (04 Jul 2023 13:33)  T(F): 98.6 (04 Jul 2023 14:32), Max: 100.5 (04 Jul 2023 13:33)  HR: 102 (04 Jul 2023 14:32) (92 - 110)  BP: 99/60 (04 Jul 2023 14:32) (99/60 - 115/65)  BP(mean): --  RR: 20 (04 Jul 2023 14:32) (16 - 24)  SpO2: 97% (04 Jul 2023 14:32) (95% - 100%)    Parameters below as of 04 Jul 2023 14:32  Patient On (Oxygen Delivery Method): room air        I&O's Detail    03 Jul 2023 07:01  -  04 Jul 2023 07:00  --------------------------------------------------------  IN:    dextrose 5% + sodium chloride 0.45% + potassium chloride 20 mEq/L - Pediatric: 790.5 mL    IV PiggyBack: 20.7 mL  Total IN: 811.2 mL    OUT:    Voided (mL): 500 mL  Total OUT: 500 mL    Total NET: 311.2 mL      04 Jul 2023 07:01  -  04 Jul 2023 17:01  --------------------------------------------------------  IN:    dextrose 5% + sodium chloride 0.45% + potassium chloride 20 mEq/L - Pediatric: 450 mL    Oral Fluid: 355 mL  Total IN: 805 mL    OUT:  Total OUT: 0 mL    Total NET: 805 mL            PHYSICAL EXAM  General: well appearing, no apparent distress  HENT: moist mucous membranes, no mouth sores of mucosal bleeding, no conjunctival injection, neck supple, no masses  Cardio: regular rate and rhythm, normal S1, S2, no murmurs, rubs or gallops, cap refill < 2 seconds  Respiratory: lungs to clear to auscultation bilaterally, no increased work of breathing  Abdomen: soft, nontender, nondistended, normoactive bowel sounds, no hepatosplenomegaly, no masses  Lymphadenopathy: no adenopathy appreciated  Skin: no rashes, no ulcers or erythema  Neuro: no focal neurological deficits noted, PERRL      Lab Results:  CBC Full  -  ( 04 Jul 2023 08:38 )  WBC Count : 9.46 K/uL  RBC Count : 1.98 M/uL  Hemoglobin : 7.1 g/dL  Hematocrit : 19.3 %  Platelet Count - Automated : 353 K/uL  Mean Cell Volume : 97.5 fL  Mean Cell Hemoglobin : 35.9 pg  Mean Cell Hemoglobin Concentration : 36.8 gm/dL  Auto Neutrophil # : 7.38 K/uL  Auto Lymphocyte # : 1.74 K/uL  Auto Monocyte # : 0.17 K/uL  Auto Eosinophil # : 0.09 K/uL  Auto Basophil # : 0.00 K/uL  Auto Neutrophil % : 71.9 %  Auto Lymphocyte % : 18.4 %  Auto Monocyte % : 1.8 %  Auto Eosinophil % : 0.9 %  Auto Basophil % : 0.0 %    07-04    134<L>  |  103  |  7   ----------------------------<  107<H>  4.3   |  19<L>  |  0.56    Ca    9.3      04 Jul 2023 08:38  Phos  3.4     07-04  Mg     1.90     07-04            MICROBIOLOGY/CULTURES:    RADIOLOGY RESULTS:

## 2023-07-05 NOTE — PROGRESS NOTE PEDS - ASSESSMENT
Florina is a 18 yo F with Hgb SS admitted for a VOE of her lower back, which was unresponsive to oral pain medications at home, now requiring IV pain medications. She became febrile at 7am on 7/5, but has remained hemodynamically stable    Heme:  - Hydroxyurea  - Lovenox daily for DVT ppx  - trend hgb with CBC    ID  - CTX qD  - f/u BCx    FEN/GI  - IVF @ 1M  - famotidine qD  - Senna and miralax BID  - Regular diet    Resp  - Flovent BID (home med)  - Albuterol PRN (home med)    Neuro/Pain  - Morphine 6mg Q3 ATC, titrate as needed  - Toradol Q6 ATC   Florina is a 18 yo F with Hgb SS admitted for a VOE of her lower back, which was unresponsive to oral pain medications at home, now requiring IV pain medications. She became febrile at 7am on 7/5, but has remained hemodynamically stable    Heme:  - Hydroxyurea  - Lovenox daily for DVT ppx  - trend hgb with CBC, obtain hgb electrophoresis    ID  - CTX qD  - obtain RVP  - f/u BCx    FEN/GI  - IVF @ 1M  - famotidine qD  - Senna and miralax BID  - Regular diet    Resp  - Flovent BID (home med)  - Albuterol PRN (home med)    Neuro/Pain  - Morphine 6mg Q3 ATC, titrate as needed  - Toradol Q6 ATC

## 2023-07-05 NOTE — PROGRESS NOTE PEDS - PROVIDER SPECIALTY LIST PEDS
Heme/Onc
Heme/Onc
Patient seen and examined. Case discussed with Dr. Henry. Stable for dc to [facility]. Should consider repeat TOV at [facility]. Will need outpatient f/u for slightly elevated Keppra level. See daily progress note for additional details.    TIME SPENT ON DISCHARGE PLANNING INCLUDING COORDINATION OF CARE: 34mins

## 2023-07-06 ENCOUNTER — TRANSCRIPTION ENCOUNTER (OUTPATIENT)
Age: 18
End: 2023-07-06

## 2023-07-06 VITALS — DIASTOLIC BLOOD PRESSURE: 60 MMHG | SYSTOLIC BLOOD PRESSURE: 96 MMHG

## 2023-07-06 LAB
24R-OH-CALCIDIOL SERPL-MCNC: 12.8 NG/ML — LOW (ref 30–80)
ALBUMIN SERPL ELPH-MCNC: 3.7 G/DL — SIGNIFICANT CHANGE UP (ref 3.3–5)
ALP SERPL-CCNC: 138 U/L — HIGH (ref 40–120)
ALT FLD-CCNC: 93 U/L — HIGH (ref 4–33)
ANION GAP SERPL CALC-SCNC: 10 MMOL/L — SIGNIFICANT CHANGE UP (ref 7–14)
AST SERPL-CCNC: 79 U/L — HIGH (ref 4–32)
BASOPHILS # BLD AUTO: 0 K/UL — SIGNIFICANT CHANGE UP (ref 0–0.2)
BASOPHILS NFR BLD AUTO: 0 % — SIGNIFICANT CHANGE UP (ref 0–2)
BILIRUB SERPL-MCNC: 1.7 MG/DL — HIGH (ref 0.2–1.2)
BLD GP AB SCN SERPL QL: NEGATIVE — SIGNIFICANT CHANGE UP
BUN SERPL-MCNC: 7 MG/DL — SIGNIFICANT CHANGE UP (ref 7–23)
BURR CELLS BLD QL SMEAR: SLIGHT — SIGNIFICANT CHANGE UP
CALCIUM SERPL-MCNC: 8.9 MG/DL — SIGNIFICANT CHANGE UP (ref 8.4–10.5)
CHLORIDE SERPL-SCNC: 104 MMOL/L — SIGNIFICANT CHANGE UP (ref 98–107)
CO2 SERPL-SCNC: 21 MMOL/L — LOW (ref 22–31)
CREAT SERPL-MCNC: 0.58 MG/DL — SIGNIFICANT CHANGE UP (ref 0.5–1.3)
EGFR: 134 ML/MIN/1.73M2 — SIGNIFICANT CHANGE UP
EOSINOPHIL # BLD AUTO: 0.2 K/UL — SIGNIFICANT CHANGE UP (ref 0–0.5)
EOSINOPHIL NFR BLD AUTO: 2.6 % — SIGNIFICANT CHANGE UP (ref 0–6)
FERRITIN SERPL-MCNC: 527 NG/ML — HIGH (ref 15–150)
GIANT PLATELETS BLD QL SMEAR: PRESENT — SIGNIFICANT CHANGE UP
GLUCOSE SERPL-MCNC: 105 MG/DL — HIGH (ref 70–99)
HCG SERPL-ACNC: <1 MIU/ML — SIGNIFICANT CHANGE UP
HCT VFR BLD CALC: 16.5 % — CRITICAL LOW (ref 34.5–45)
HGB BLD-MCNC: 6 G/DL — CRITICAL LOW (ref 11.5–15.5)
IANC: 3.33 K/UL — SIGNIFICANT CHANGE UP (ref 1.8–7.4)
IRON SATN MFR SERPL: 141 UG/DL — SIGNIFICANT CHANGE UP (ref 30–160)
IRON SATN MFR SERPL: 56 % — HIGH (ref 14–50)
LDH SERPL L TO P-CCNC: 813 U/L — HIGH (ref 135–225)
LYMPHOCYTES # BLD AUTO: 3.34 K/UL — HIGH (ref 1–3.3)
LYMPHOCYTES # BLD AUTO: 42.6 % — SIGNIFICANT CHANGE UP (ref 13–44)
MACROCYTES BLD QL: SIGNIFICANT CHANGE UP
MAGNESIUM SERPL-MCNC: 2 MG/DL — SIGNIFICANT CHANGE UP (ref 1.6–2.6)
MANUAL SMEAR VERIFICATION: SIGNIFICANT CHANGE UP
MCHC RBC-ENTMCNC: 35.7 PG — HIGH (ref 27–34)
MCHC RBC-ENTMCNC: 36.4 GM/DL — HIGH (ref 32–36)
MCV RBC AUTO: 98.2 FL — SIGNIFICANT CHANGE UP (ref 80–100)
MICROCYTES BLD QL: SLIGHT — SIGNIFICANT CHANGE UP
MONOCYTES # BLD AUTO: 0.81 K/UL — SIGNIFICANT CHANGE UP (ref 0–0.9)
MONOCYTES NFR BLD AUTO: 10.4 % — SIGNIFICANT CHANGE UP (ref 2–14)
NEUTROPHILS # BLD AUTO: 3.27 K/UL — SIGNIFICANT CHANGE UP (ref 1.8–7.4)
NEUTROPHILS NFR BLD AUTO: 37.4 % — LOW (ref 43–77)
NEUTS BAND # BLD: 4.4 % — SIGNIFICANT CHANGE UP (ref 0–6)
NRBC # BLD: 2 /100 — HIGH (ref 0–0)
PHOSPHATE SERPL-MCNC: 3.9 MG/DL — SIGNIFICANT CHANGE UP (ref 2.5–4.5)
PLAT MORPH BLD: NORMAL — SIGNIFICANT CHANGE UP
PLATELET # BLD AUTO: 298 K/UL — SIGNIFICANT CHANGE UP (ref 150–400)
PLATELET COUNT - ESTIMATE: NORMAL — SIGNIFICANT CHANGE UP
POLYCHROMASIA BLD QL SMEAR: SLIGHT — SIGNIFICANT CHANGE UP
POTASSIUM SERPL-MCNC: 4.6 MMOL/L — SIGNIFICANT CHANGE UP (ref 3.5–5.3)
POTASSIUM SERPL-SCNC: 4.6 MMOL/L — SIGNIFICANT CHANGE UP (ref 3.5–5.3)
PROT SERPL-MCNC: 6.4 G/DL — SIGNIFICANT CHANGE UP (ref 6–8.3)
RBC # BLD: 1.68 M/UL — LOW (ref 3.8–5.2)
RBC # BLD: 1.68 M/UL — LOW (ref 3.8–5.2)
RBC # FLD: 21.6 % — HIGH (ref 10.3–14.5)
RBC BLD AUTO: NORMAL — SIGNIFICANT CHANGE UP
RETICS #: 176 K/UL — HIGH (ref 25–125)
RETICS/RBC NFR: 10.6 % — HIGH (ref 0.5–2.5)
RH IG SCN BLD-IMP: POSITIVE — SIGNIFICANT CHANGE UP
SODIUM SERPL-SCNC: 135 MMOL/L — SIGNIFICANT CHANGE UP (ref 135–145)
TIBC SERPL-MCNC: 250 UG/DL — SIGNIFICANT CHANGE UP (ref 220–430)
UIBC SERPL-MCNC: 109 UG/DL — LOW (ref 110–370)
VARIANT LYMPHS # BLD: 2.6 % — SIGNIFICANT CHANGE UP (ref 0–6)
WBC # BLD: 7.83 K/UL — SIGNIFICANT CHANGE UP (ref 3.8–10.5)
WBC # FLD AUTO: 7.83 K/UL — SIGNIFICANT CHANGE UP (ref 3.8–10.5)

## 2023-07-06 PROCEDURE — 99238 HOSP IP/OBS DSCHRG MGMT 30/<: CPT | Mod: GC

## 2023-07-06 RX ORDER — OXYCODONE HYDROCHLORIDE 5 MG/1
9 TABLET ORAL
Qty: 135 | Refills: 0
Start: 2023-07-06

## 2023-07-06 RX ORDER — CHOLECALCIFEROL (VITAMIN D3) 125 MCG
1 CAPSULE ORAL
Qty: 0 | Refills: 0 | DISCHARGE

## 2023-07-06 RX ORDER — IBUPROFEN 200 MG
1 TABLET ORAL
Qty: 0 | Refills: 0 | DISCHARGE
Start: 2023-07-06

## 2023-07-06 RX ORDER — ERGOCALCIFEROL 1.25 MG/1
1 CAPSULE ORAL
Qty: 0 | Refills: 0 | DISCHARGE

## 2023-07-06 RX ORDER — ACETAMINOPHEN 500 MG
650 TABLET ORAL ONCE
Refills: 0 | Status: COMPLETED | OUTPATIENT
Start: 2023-07-06 | End: 2023-07-06

## 2023-07-06 RX ORDER — FAMOTIDINE 10 MG/ML
1 INJECTION INTRAVENOUS
Qty: 10 | Refills: 0
Start: 2023-07-06 | End: 2023-07-10

## 2023-07-06 RX ORDER — OXYCODONE HYDROCHLORIDE 5 MG/1
9 TABLET ORAL
Qty: 0 | Refills: 0 | DISCHARGE
Start: 2023-07-06

## 2023-07-06 RX ORDER — LACTULOSE 10 G/15ML
30 SOLUTION ORAL
Qty: 0 | Refills: 0 | DISCHARGE

## 2023-07-06 RX ORDER — DIPHENHYDRAMINE HCL 50 MG
25 CAPSULE ORAL ONCE
Refills: 0 | Status: COMPLETED | OUTPATIENT
Start: 2023-07-06 | End: 2023-07-06

## 2023-07-06 RX ADMIN — Medication 25 MILLIGRAM(S): at 13:02

## 2023-07-06 RX ADMIN — SENNA PLUS 1 TABLET(S): 8.6 TABLET ORAL at 10:32

## 2023-07-06 RX ADMIN — Medication 650 MILLIGRAM(S): at 13:02

## 2023-07-06 RX ADMIN — OXYCODONE HYDROCHLORIDE 9 MILLIGRAM(S): 5 TABLET ORAL at 14:32

## 2023-07-06 RX ADMIN — Medication 1000 UNIT(S): at 10:32

## 2023-07-06 RX ADMIN — OXYCODONE HYDROCHLORIDE 9 MILLIGRAM(S): 5 TABLET ORAL at 06:11

## 2023-07-06 RX ADMIN — Medication 600 MILLIGRAM(S): at 00:20

## 2023-07-06 RX ADMIN — OXYCODONE HYDROCHLORIDE 9 MILLIGRAM(S): 5 TABLET ORAL at 15:30

## 2023-07-06 RX ADMIN — Medication 600 MILLIGRAM(S): at 13:25

## 2023-07-06 RX ADMIN — DEXTROSE MONOHYDRATE, SODIUM CHLORIDE, AND POTASSIUM CHLORIDE 90 MILLILITER(S): 50; .745; 4.5 INJECTION, SOLUTION INTRAVENOUS at 07:16

## 2023-07-06 RX ADMIN — Medication 600 MILLIGRAM(S): at 12:25

## 2023-07-06 RX ADMIN — OXYCODONE HYDROCHLORIDE 9 MILLIGRAM(S): 5 TABLET ORAL at 02:47

## 2023-07-06 RX ADMIN — OXYCODONE HYDROCHLORIDE 9 MILLIGRAM(S): 5 TABLET ORAL at 02:41

## 2023-07-06 RX ADMIN — OXYCODONE HYDROCHLORIDE 9 MILLIGRAM(S): 5 TABLET ORAL at 06:05

## 2023-07-06 RX ADMIN — Medication 600 MILLIGRAM(S): at 05:30

## 2023-07-06 RX ADMIN — OXYCODONE HYDROCHLORIDE 9 MILLIGRAM(S): 5 TABLET ORAL at 10:32

## 2023-07-06 RX ADMIN — Medication 600 MILLIGRAM(S): at 18:00

## 2023-07-06 RX ADMIN — Medication 600 MILLIGRAM(S): at 05:19

## 2023-07-06 RX ADMIN — Medication 650 MILLIGRAM(S): at 14:02

## 2023-07-06 RX ADMIN — OXYCODONE HYDROCHLORIDE 9 MILLIGRAM(S): 5 TABLET ORAL at 11:30

## 2023-07-06 RX ADMIN — ENOXAPARIN SODIUM 40 MILLIGRAM(S): 100 INJECTION SUBCUTANEOUS at 10:33

## 2023-07-06 RX ADMIN — FAMOTIDINE 20 MILLIGRAM(S): 10 INJECTION INTRAVENOUS at 10:32

## 2023-07-06 RX ADMIN — LACTULOSE 30 GRAM(S): 10 SOLUTION ORAL at 07:04

## 2023-07-06 RX ADMIN — OXYCODONE HYDROCHLORIDE 9 MILLIGRAM(S): 5 TABLET ORAL at 18:00

## 2023-07-06 RX ADMIN — CEFTRIAXONE 100 MILLIGRAM(S): 500 INJECTION, POWDER, FOR SOLUTION INTRAMUSCULAR; INTRAVENOUS at 11:20

## 2023-07-06 RX ADMIN — Medication 600 MILLIGRAM(S): at 00:03

## 2023-07-06 RX ADMIN — Medication 1 MILLIGRAM(S): at 10:32

## 2023-07-06 RX ADMIN — Medication 1 PUFF(S): at 08:53

## 2023-07-06 RX ADMIN — POLYETHYLENE GLYCOL 3350 17 GRAM(S): 17 POWDER, FOR SOLUTION ORAL at 10:32

## 2023-07-06 NOTE — DISCHARGE NOTE PROVIDER - CARE PROVIDER_API CALL
Jaz Moran Otema  Pediatrics  [id number] [address] [place] [id number]  Phone: [phone]  Fax: [phone]  Follow Up Time: Routine    BEBO MARTE  [address] [place] [address]  Phone: [phone]  Fax: [phone]  Follow Up Time: 1-3 days

## 2023-07-06 NOTE — DISCHARGE NOTE NURSING/CASE MANAGEMENT/SOCIAL WORK - PATIENT PORTAL LINK FT
You can access the FollowMyHealth Patient Portal offered by [facility] by registering at the following website: [email or web address]. By joining [employer]’s FollowMyHealth portal, you will also be able to view your health information using other applications (apps) compatible with our system.

## 2023-07-06 NOTE — DISCHARGE NOTE PROVIDER - NSDCCPCAREPLAN_GEN_ALL_CORE_FT
PRINCIPAL DISCHARGE DIAGNOSIS  Diagnosis: Sickle cell pain crisis  Assessment and Plan of Treatment: Call your local emergency number (911 in the [place]) if:  You have shortness of breath or chest pain.  You are a man and have an erection that is painful and does not go away.  You lose vision in one or both eyes.  When should I seek immediate care?  You feel like you cannot cope with your pain, or you feel like hurting yourself.  You have behavior changes, a seizure, or faint.  You have a fever.  You have abdominal pain, nausea, or vomiting.  You have a different or worse headache.  You have new weakness or numbness in your arm, leg, or face.  You have new pain in any part of your body.  Your urine is dark and you are urinating less than usual or not at all.  You are dizzy, lightheaded, or faint.  When should I call my doctor?  You have any new signs or symptoms.  You have blood in your urine.  You are constipated or you have diarrhea.  You have changes in your vision.  You have increased fatigue.  You plan to travel by airplane or to a high [facility].  You have questions or concerns about your condition or care.

## 2023-07-06 NOTE — DISCHARGE NOTE PROVIDER - NSDCMRMEDTOKEN_GEN_ALL_CORE_FT
Breo Ellipta 100-25 MCG/ACT Inhalation Aerosol Powder Breath Activated: Use 1 inhalation once daily  famotidine 20 mg oral tablet: 1 tab(s) orally 2 times a day  Flovent 44 mcg/inh inhalation aerosol with adapter: 2 puff(s) inhaled 2 times a day  folic acid 1 mg oral tablet: 1 tab(s) orally once a day  hydroxyurea 500 mg oral capsule: 3 caps for 5 days (Sunday to Thursday), 2 caps for 2 days (Friday to Saturday)  ibuprofen 600 mg oral tablet: 1 tab(s) orally every 6 hours  oxyCODONE 5 mg oral tablet: 1 tab every 4 hours for the first two days  1 tab every 6 hours for the next two days  1 tab every 6 hours as needed for pain MDD:30 mg  oxyCODONE 5 mg/5 mL oral solution: 9 milliliter(s) orally every 4 hours  oxyCODONE 5 mg/5 mL oral solution: 9 milliliter(s) orally every 4 to 8 hours Please take 9 mL every 4 hours on 7/6; 9 mL every 6 hours on 7/7; 9 mL every 8 hours on 7/8, and every 12 hours (or twice in one day) on 7/9 MDD: 36 mL  polyethylene glycol 3350 oral powder for reconstitution: 17 gram(s) orally 2 times a day, As Needed   senna oral tablet: 2 tab(s) orally once a day   Ventolin 90 mcg/inh inhalation aerosol: 2 puff(s) inhaled every 4 hours, As Needed shortness of breath  Vitamin D3 1000 intl units oral capsule: 1 cap(s) orally once a day   famotidine 20 mg oral tablet: 1 tab(s) orally 2 times a day  Flovent 44 mcg/inh inhalation aerosol with adapter: 2 puff(s) inhaled 2 times a day  folic acid 1 mg oral tablet: 1 tab(s) orally once a day  hydroxyurea 500 mg oral capsule: 3 caps for 5 days (Sunday to Thursday), 2 caps for 2 days (Friday to Saturday)  ibuprofen 600 mg oral tablet: 1 tab(s) orally every 6 hours  oxyCODONE 5 mg oral tablet: 1 tab every 4 hours for the first two days  1 tab every 6 hours for the next two days  1 tab every 6 hours as needed for pain MDD:30 mg  oxyCODONE 5 mg/5 mL oral solution: 9 milliliter(s) orally every 4 hours  oxyCODONE 5 mg/5 mL oral solution: 9 milliliter(s) orally every 4 to 8 hours Please take 9 mL every 4 hours on 7/6; 9 mL every 6 hours on 7/7; 9 mL every 8 hours on 7/8, and every 12 hours (or twice in one day) on 7/9 MDD: 36 mL  polyethylene glycol 3350 oral powder for reconstitution: 17 gram(s) orally 2 times a day, As Needed   senna oral tablet: 2 tab(s) orally once a day   Ventolin 90 mcg/inh inhalation aerosol: 2 puff(s) inhaled every 4 hours, As Needed shortness of breath  Vitamin D3 1000 intl units oral capsule: 1 cap(s) orally once a day   Breo Ellipta 100-25 MCG/ACT Inhalation Aerosol Powder Breath Activated: Use 1 inhalation once daily  ergocalciferol 1.25 mg (50,000 intl units) oral capsule: 1 cap(s) orally once a week  famotidine 20 mg oral tablet: 1 tab(s) orally 2 times a day  Flovent 44 mcg/inh inhalation aerosol with adapter: 2 puff(s) inhaled 2 times a day  folic acid 1 mg oral tablet: 1 tab(s) orally once a day  hydroxyurea 500 mg oral capsule: 3 caps for 5 days (Sunday to Thursday), 2 caps for 2 days (Friday to Saturday)  ibuprofen 600 mg oral tablet: 1 tab(s) orally every 6 hours  oxyCODONE 5 mg oral tablet: 1 tab every 4 hours for the first two days  1 tab every 6 hours for the next two days  1 tab every 6 hours as needed for pain MDD:30 mg  oxyCODONE 5 mg/5 mL oral solution: 9 milliliter(s) orally every 4 to 8 hours Please take 9 mL every 4 hours on 7/6; 9 mL every 6 hours on 7/7; 9 mL every 8 hours on 7/8, and every 12 hours (or twice in one day) on 7/9 MDD: 36 mL  polyethylene glycol 3350 oral powder for reconstitution: 17 gram(s) orally 2 times a day, As Needed   Ventolin 90 mcg/inh inhalation aerosol: 2 puff(s) inhaled every 4 hours, As Needed shortness of breath

## 2023-07-06 NOTE — DISCHARGE NOTE NURSING/CASE MANAGEMENT/SOCIAL WORK - NSDCPEFALRISK_GEN_ALL_CORE
For information on Fall & Injury Prevention, visit: [email or web address] OR  [email or web address] OR  https://www.cdc.gov/steadi/patient.html

## 2023-07-06 NOTE — DISCHARGE NOTE PROVIDER - NPI NUMBER (FOR SYSADMIN USE ONLY) :
[[id number]],[[id number]] Bed in lowest position, wheels locked, appropriate side rails in place/Call bell, personal items and telephone in reach/Instruct patient to call for assistance before getting out of bed or chair/Non-slip footwear when patient is out of bed/[place] to call system/Physically safe environment - no spills, clutter or unnecessary equipment/Purposeful Proactive Rounding/Room/bathroom lighting operational, light cord in reach

## 2023-07-06 NOTE — DISCHARGE NOTE PROVIDER - PROVIDER TOKENS
PROVIDER:[TOKEN:[[id number]],FOLLOWUP:[Routine]],PROVIDER:[TOKEN:[[id number]],FOLLOWUP:[1-3 days]]

## 2023-07-06 NOTE — DISCHARGE NOTE PROVIDER - HOSPITAL COURSE
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 ER for lower back pain and pain in bilateral thighs feeling like her typical sickle cell pain.  Pain started last night while sleeping in bed, no trauma, no focal weakness or numbness.  Patient took ibuprofen and oxycodone prior to arrival with minimal relief.  No shortness of breath or cough or chest pains. Recently finished menses within the lat couple of of days. Pt states normally does not need to take any pain killers. Pain currently 8/10 at lower back. Responds to pain medication initially but relief not lasting.  Denies fever, chills , chest pain , abdominal pain, recent illness , sick contacts, dehydration. Endorses constipation, no BM in last 2 days. Also endorses decreased PO intake over the last day due to pain.   Of note, patient was initially admitted to [facility] however was transferred to [facility] as she has not yet transitioned care to adult medicine.    Med 4 Course (7/3 - 7/6)  Patient arrived to the floor hemodynamically stable while on IV morphine q3 hr and IV toradol q6 hr to treat her pain. On morning of 7/5, patient spiked a fever greater than 101 so blood culture was obtained in addition to administration of x1 dose Ceftriaxone and a CXR that was unremarkable. Additionally on 7/5, her pain was much improved so she was ultimately spaced to PO oxy 9mg q4hr and PO motrin 600 mg q6hr. On 7/6, patient finally had a BM after x1 lactulose. Alson on 7/6, CBC showed hemoglobin of 6 so patient received x1 unit pRBCs. At this time, patient was deemed safe for discharge with PO oxy taper    On day of discharge, VS reviewed and remained wnl. The patient continued to tolerate PO with adequate UOP. The patient remained well-appearing, with no concerning findings noted on physical exam. No additional recommendations noted. Care plan d/w caregivers who endorsed understanding. Anticipatory guidance and strict return precautions d/w caregivers in great detail. Child deemed stable for d/c home w/ recommended PMD f/u in 1-2 days of discharge.     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  Pain started last night while sleeping in bed, no trauma, no focal weakness or numbness.  Patient took ibuprofen and oxycodone prior to arrival with minimal relief.  No shortness of breath or cough or chest pains. Recently finished menses within the lat couple of of days. Pt states normally does not need to take any pain killers. Pain currently 8/10 at lower back. Responds to pain medication initially but relief not lasting.  Denies fever, chills , chest pain , abdominal pain, recent illness , sick contacts, dehydration. Endorses constipation, no BM in last 2 days. Also endorses decreased PO intake over the last day due to pain.   Of note, patient was initially admitted to [facility], however was transferred to [facility] as she has not yet transitioned care to adult medicine.    Med 4 Course (7/3 - 7/6)  Patient arrived to the floor hemodynamically stable while on IV morphine q3 hr and IV toradol q6 hr to treat her pain. On morning of 7/5, patient spiked a fever greater than 101 so blood culture was obtained in addition to administration of x1 dose Ceftriaxone and a CXR that was unremarkable. Additionally on 7/5, her pain was much improved so she was ultimately spaced to PO oxy 9mg q4hr and PO motrin 600 mg q6hr. On 7/6, patient finally had a BM after x1 lactulose. Alson on 7/6, CBC showed hemoglobin of 6 so patient received x1 unit pRBCs. At this time, patient was deemed safe for discharge with PO oxy taper    On day of discharge, VS reviewed and remained wnl. The patient continued to tolerate PO with adequate UOP. The patient remained well-appearing, with no concerning findings noted on physical exam. No additional recommendations noted. Care plan d/w caregivers who endorsed understanding. Anticipatory guidance and strict return precautions d/w caregivers in great detail. Child deemed stable for d/c home w/ recommended PMD f/u in 1-2 days of discharge.     Discharge Vitals  Vital Signs Last 24 Hrs  T(C): 36.8 (06 Jul 2023 13:41), Max: 36.9 (05 Jul 2023 18:43)  T(F): 98.2 (06 Jul 2023 13:41), Max: 98.4 (05 Jul 2023 18:43)  HR: 90 (06 Jul 2023 13:41) (85 - 106)  BP: 98/61 (06 Jul 2023 13:41) (94/52 - 115/58)  BP(mean): --  RR: 18 (06 Jul 2023 13:41) (18 - 20)  SpO2: 95% (06 Jul 2023 13:41) (92% - 99%)    Parameters below as of 06 Jul 2023 13:41  Patient On (Oxygen Delivery Method): room air    Discharge Physical Exam  Physical Exam: PHYSICAL EXAM:  GENERAL: NAD  HEENT:  Head atraumatic, EOMI, PERRLA, conjunctiva and sclera clear; Moist mucous membranes, normal oropharynx  NECK: Supple, no LAD  CHEST/LUNG: Clear to auscultation bilaterally; No rales, rhonchi, wheezing, or rubs. Unlabored respirations on room air  HEART: Regular rate and rhythm; No murmurs, rubs, or gallops  ABDOMEN: Bowel sounds present; Soft, Nontender, Nondistended. No hepatomegaly  EXTREMITIES:  2+ Peripheral Pulses, brisk capillary refill. No clubbing, cyanosis, or edema  NERVOUS SYSTEM:  Alert & Oriented X3, non-focal and spontaneous movements of all extremities  SKIN: No rashes or lesions

## 2023-07-06 NOTE — PROVIDER CONTACT NOTE (CRITICAL VALUE NOTIFICATION) - BACKGROUND
17 y.o with Hgb SS admitted for pain crisis ATC pain meds No c/o pain at this time Critical lab hemoglobin 6.0 Hematocrit 16.5

## 2023-07-06 NOTE — PROVIDER CONTACT NOTE (CRITICAL VALUE NOTIFICATION) - SITUATION
Patient has history of SCD, provider made aware of critical hematocrit
17 y.o with Hgb SS admitted for pain crisis ATC pain meds No c/o pain at this time Critical lab hemoglobin 6.0 Hematocrit 16.5

## 2023-07-08 LAB
CULTURE RESULTS: SIGNIFICANT CHANGE UP
SPECIMEN SOURCE: SIGNIFICANT CHANGE UP

## 2023-07-10 LAB
CULTURE RESULTS: SIGNIFICANT CHANGE UP
CULTURE RESULTS: SIGNIFICANT CHANGE UP
SPECIMEN SOURCE: SIGNIFICANT CHANGE UP
SPECIMEN SOURCE: SIGNIFICANT CHANGE UP

## 2023-08-11 ENCOUNTER — OUTPATIENT (OUTPATIENT)
Dept: OUTPATIENT SERVICES | Age: 18
LOS: 1 days | Discharge: HOME | End: 2023-08-11

## 2023-08-11 ENCOUNTER — APPOINTMENT (OUTPATIENT)
Dept: PEDIATRIC HEMATOLOGY/ONCOLOGY | Facility: CLINIC | Age: 18
End: 2023-08-11
Payer: COMMERCIAL

## 2023-08-11 VITALS
OXYGEN SATURATION: 100 % | DIASTOLIC BLOOD PRESSURE: 65 MMHG | SYSTOLIC BLOOD PRESSURE: 96 MMHG | RESPIRATION RATE: 20 BRPM | TEMPERATURE: 98.6 F | HEART RATE: 86 BPM | BODY MASS INDEX: 16.81 KG/M2 | WEIGHT: 102.71 LBS

## 2023-08-11 VITALS — WEIGHT: 102.74 LBS | BODY MASS INDEX: 16.91 KG/M2 | HEIGHT: 65.55 IN

## 2023-08-11 VITALS — HEIGHT: 65.55 IN

## 2023-08-11 DIAGNOSIS — D57.00 HB-SS DISEASE WITH CRISIS, UNSPECIFIED: ICD-10-CM

## 2023-08-11 PROCEDURE — 99499A: CUSTOM

## 2023-08-11 RX ORDER — OXYCODONE 5 MG/1
5 TABLET ORAL
Refills: 0 | Status: DISCONTINUED | COMMUNITY
Start: 2023-07-06 | End: 2023-08-11

## 2023-08-11 RX ORDER — IBUPROFEN 600 MG/1
600 TABLET, FILM COATED ORAL
Qty: 1 | Refills: 4 | Status: DISCONTINUED | COMMUNITY
Start: 2023-07-06 | End: 2023-08-11

## 2023-08-11 NOTE — PHYSICAL EXAM
[No focal deficits] : no focal deficits [Normal] : affect appropriate [de-identified] : supple [de-identified] : per our scale ~ 2kg wt loss since hospitalization; she does not notice the difference, all clothing still fit the same. [de-identified] : brisk CR, 2+ peripheral pulses

## 2023-08-11 NOTE — HISTORY OF PRESENT ILLNESS
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  She has occasional VOEs.  She started Hydroxyurea in 10/2015.  She has a history of LVH and follows with Cardiology.  Menarche at 10yo.  All 3 siblings are not an HLA match.  Met with transplant team, potential donor no longer available.\nando  Admitted 6/29/19 with back pain not relieved with home meds.  Spiked temp 6/30/19, RVP and BCx neg, received empiric Ceftriaxone.  Hb decreased to 6.5g/dL, however had retic count and was asymptomatic.  At f/u outpatient was found to have improving Hb and reticulocytosis, however had symptoms of headache and dizziness.  Therefore received PRBCs on 7/6/19.\nando  Had abdominal pain and emesis when she moved to school and was seen in the ED at [place] 8/14 where she was found to be dehydrated.  She did not require any admission. She remained afebrile. [de-identified] : Admitted 7/3-6/2023 with VOE of her lower back and b/l thighs a few days after her menses. She initially ended up admitted to [facility] and had to be transferred here. She became febrile during the admission, w/u including CXR was negative. Her anemia worsened to a Hb of 6g/dL on 7/6 and she received PRBCs and was d/c. She reports being back to baseline. Improved adherence with Hydroxyurea.  Normal menses, monthly, lasts 5 days,   2 ppd. Tolerable pain. Has established care with a new PMD.  Had a visit in June and blood work obtained. However, the blood work I previously requested has not been done.  Attends [employer].  Still pursuing nursing.  School went well.  Has been denied on campus housing despite our letter and filling in the appropriate forms.  HEADSS:  feels safe at school and home; no smoking, drinking, vaping or illicit drug use; not sexually active.

## 2023-08-11 NOTE — CONSULT LETTER
[Dear  ___] : Dear  [unfilled], [Courtesy Letter:] : I had the pleasure of seeing your patient, [unfilled], in my office today. [Please see my note below.] : Please see my note below. [Consult Closing:] : Thank you very much for allowing me to participate in the care of this patient.  If you have any questions, please do not hesitate to contact me. [Sincerely,] : Sincerely, [FreeTextEntry2] : [place] [address] Telephone #: [phone]  [FreeTextEntry3] : Jaz Moran MD, MPH, FAAP Attending Physician [facility] Hematology /Oncology and Cellular Therapy  of Pediatrics [employer] at [facility]

## 2023-08-14 ENCOUNTER — NON-APPOINTMENT (OUTPATIENT)
Age: 18
End: 2023-08-14

## 2023-09-15 ENCOUNTER — NON-APPOINTMENT (OUTPATIENT)
Age: 18
End: 2023-09-15

## 2023-10-16 ENCOUNTER — NON-APPOINTMENT (OUTPATIENT)
Age: 18
End: 2023-10-16

## 2023-11-24 ENCOUNTER — NON-APPOINTMENT (OUTPATIENT)
Age: 18
End: 2023-11-24

## 2023-12-27 ENCOUNTER — NON-APPOINTMENT (OUTPATIENT)
Age: 18
End: 2023-12-27

## 2024-01-04 ENCOUNTER — OUTPATIENT (OUTPATIENT)
Dept: OUTPATIENT SERVICES | Age: 19
LOS: 1 days | Discharge: HOME | End: 2024-01-04

## 2024-01-05 ENCOUNTER — APPOINTMENT (OUTPATIENT)
Dept: PEDIATRIC HEMATOLOGY/ONCOLOGY | Facility: CLINIC | Age: 19
End: 2024-01-05
Payer: COMMERCIAL

## 2024-01-05 VITALS
DIASTOLIC BLOOD PRESSURE: 64 MMHG | OXYGEN SATURATION: 97 % | HEIGHT: 65.59 IN | WEIGHT: 106.48 LBS | BODY MASS INDEX: 17.32 KG/M2 | RESPIRATION RATE: 20 BRPM | HEART RATE: 80 BPM | TEMPERATURE: 97.52 F | SYSTOLIC BLOOD PRESSURE: 107 MMHG

## 2024-01-05 DIAGNOSIS — Z71.87 ENCOUNTER FOR PEDIATRIC-TO-ADULT TRANSITION COUNSELING: ICD-10-CM

## 2024-01-05 DIAGNOSIS — R80.9 PROTEINURIA, UNSPECIFIED: ICD-10-CM

## 2024-01-05 DIAGNOSIS — Z79.64 LONG TERM (CURRENT) USE OF MYELOSUPPRESSIVE AGENT: ICD-10-CM

## 2024-01-05 DIAGNOSIS — J45.30 MILD PERSISTENT ASTHMA, UNCOMPLICATED: ICD-10-CM

## 2024-01-05 DIAGNOSIS — Z79.64 ENCOUNTER FOR THERAPEUTIC DRUG LVL MONITORING: ICD-10-CM

## 2024-01-05 DIAGNOSIS — Z51.81 ENCOUNTER FOR THERAPEUTIC DRUG LVL MONITORING: ICD-10-CM

## 2024-01-05 DIAGNOSIS — J44.9 CHRONIC OBSTRUCTIVE PULMONARY DISEASE, UNSPECIFIED: ICD-10-CM

## 2024-01-05 DIAGNOSIS — R79.89 OTHER SPECIFIED ABNORMAL FINDINGS OF BLOOD CHEMISTRY: ICD-10-CM

## 2024-01-05 PROCEDURE — 99215 OFFICE O/P EST HI 40 MIN: CPT

## 2024-01-05 RX ORDER — ERGOCALCIFEROL 1.25 MG/1
1.25 MG CAPSULE, LIQUID FILLED ORAL
Qty: 14 | Refills: 1 | Status: ACTIVE | COMMUNITY
Start: 2022-05-18 | End: 1900-01-01

## 2024-01-05 RX ORDER — FAMOTIDINE 20 MG/1
20 TABLET, FILM COATED ORAL
Qty: 14 | Refills: 0 | Status: DISCONTINUED | COMMUNITY
Start: 2023-07-06 | End: 2024-01-05

## 2024-01-05 RX ORDER — POLYETHYLENE GLYCOL 3350 17 G/17G
17 POWDER, FOR SOLUTION ORAL
Qty: 1 | Refills: 3 | Status: ACTIVE | COMMUNITY
Start: 2021-07-23 | End: 1900-01-01

## 2024-01-05 NOTE — REASON FOR VISIT
[Follow-Up Visit] : a follow-up visit for [Sickle Cell Disease] : sickle cell disease [Patient] : patient [Medical Records] : medical records [Mother] : mother

## 2024-01-05 NOTE — HISTORY OF PRESENT ILLNESS
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  She has occasional VOEs.  She started Hydroxyurea in 10/2015.  She has a history of LVH and follows with Cardiology.  Menarche at 12yo.  All 3 siblings are not an HLA match.  Met with transplant team, potential donor no longer available. Admitted 6/29/19 with back pain not relieved with home meds.  Spiked temp 6/30/19, RVP and BCx neg, received empiric Ceftriaxone.  Hb decreased to 6.5g/dL, however had retic count and was asymptomatic.  At /u outpatient was found to have improving Hb and reticulocytosis, however had symptoms of headache and dizziness.  Therefore received PRBCs on 7/6/19. Had abdominal pain and emesis when she moved to school and was seen in the ED at [place] 8/14 where she was found to be dehydrated.  She did not require any admission. She remained afebrile. Admitted 7/3-6/2023 with VOE of her lower back and b/l thighs a few days after her menses.  She initially ended up admitted to [facility] and had to be transferred here. She became febrile during the admission, w/u including CXR was negative.  Her anemia worsened to a Hb of 6g/dL on 7/6 and she received PRBCs and was d/c. [de-identified] : Doing well. Afebrile. No recent illness. No VOE.  Improved adherence with Hydroxyurea while at home during break (usually has decreased adherence at home). Normal menses, monthly, lasts 5 days, 2 ppd. Tolerable pain.  Attends [employer].  Still pursuing nursing.  School went well.  Will be returning tomorrow for the next semester. HEADSS:  feels safe at school and home; no smoking, drinking, vaping or illicit drug use; not sexually active.

## 2024-01-05 NOTE — CONSULT LETTER
[Dear  ___] : Dear  [unfilled], [Courtesy Letter:] : I had the pleasure of seeing your patient, [unfilled], in my office today. [Please see my note below.] : Please see my note below. [Consult Closing:] : Thank you very much for allowing me to participate in the care of this patient.  If you have any questions, please do not hesitate to contact me. [Sincerely,] : Sincerely, [FreeTextEntry2] : [place] [address] Telephone #: [phone]  [FreeTextEntry3] : Jaz Moran MD, MPH, FAAP Attending Physician [facility] Hematology /Oncology and Cellular Therapy  of Pediatrics [employer] at [facility]

## 2024-01-05 NOTE — PHYSICAL EXAM
[No focal deficits] : no focal deficits [Normal] : no thyromegaly or masses appreciated [de-identified] : brisk CR, 2+ peripheral pulses

## 2024-02-08 ENCOUNTER — NON-APPOINTMENT (OUTPATIENT)
Age: 19
End: 2024-02-08

## 2024-02-09 NOTE — PATIENT PROFILE PEDIATRIC. - NS PRO MODE OF ARRIVAL
PT Name: MD INR     PT Reports INR Value: 5.4 -2/3/24      Concerns/Questions Reported: or N/A:   MD INR called to notify of an out of range INR Value.    Callback Number:  [phone]      Thank you  Randa WISDOM   wheelchair

## 2024-03-20 ENCOUNTER — NON-APPOINTMENT (OUTPATIENT)
Age: 19
End: 2024-03-20

## 2024-05-30 DIAGNOSIS — D57.1 SICKLE-CELL DISEASE W/OUT CRISIS: ICD-10-CM

## 2024-06-04 ENCOUNTER — OUTPATIENT (OUTPATIENT)
Dept: OUTPATIENT SERVICES | Age: 19
LOS: 1 days | Discharge: HOME | End: 2024-06-04

## 2024-06-05 ENCOUNTER — APPOINTMENT (OUTPATIENT)
Dept: PEDIATRIC HEMATOLOGY/ONCOLOGY | Facility: CLINIC | Age: 19
End: 2024-06-05

## 2024-07-14 ENCOUNTER — NON-APPOINTMENT (OUTPATIENT)
Age: 19
End: 2024-07-14

## 2024-08-05 ENCOUNTER — APPOINTMENT (OUTPATIENT)
Dept: PEDIATRIC HEMATOLOGY/ONCOLOGY | Facility: CLINIC | Age: 19
End: 2024-08-05

## 2024-08-05 PROCEDURE — 99215 OFFICE O/P EST HI 40 MIN: CPT

## 2024-08-05 RX ORDER — PNEUMOCOCCAL 20-VALENT CONJUGATE VACCINE 2.2; 2.2; 2.2; 2.2; 2.2; 2.2; 2.2; 2.2; 2.2; 2.2; 2.2; 2.2; 2.2; 2.2; 2.2; 2.2; 4.4; 2.2; 2.2; 2.2 UG/.5ML; UG/.5ML; UG/.5ML; UG/.5ML; UG/.5ML; UG/.5ML; UG/.5ML; UG/.5ML; UG/.5ML; UG/.5ML; UG/.5ML; UG/.5ML; UG/.5ML; UG/.5ML; UG/.5ML; UG/.5ML; UG/.5ML; UG/.5ML; UG/.5ML; UG/.5ML
0.5 INJECTION, SUSPENSION INTRAMUSCULAR ONCE
Refills: 0 | Status: COMPLETED | OUTPATIENT
Start: 2024-08-05 | End: 2024-08-05

## 2024-08-05 RX ADMIN — PNEUMOCOCCAL 20-VALENT CONJUGATE VACCINE 0.5 MILLILITER(S)
2.2; 2.2; 2.2; 2.2; 2.2; 2.2; 2.2; 2.2; 2.2; 2.2; 2.2; 2.2; 2.2; 2.2; 2.2; 2.2; 4.4; 2.2; 2.2; 2.2 INJECTION, SUSPENSION INTRAMUSCULAR at 16:29

## 2024-08-06 NOTE — PHYSICAL EXAM
[No focal deficits] : no focal deficits [Normal] : affect appropriate [de-identified] : brisk CR, 2+ peripheral pulses

## 2024-08-06 NOTE — HISTORY OF PRESENT ILLNESS
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  She has occasional VOEs.  She started Hydroxyurea in 10/2015.  She has a history of LVH and follows with Cardiology.  Menarche at 10yo.  All 3 siblings are not an HLA match.  Met with transplant team, potential donor no longer available. Admitted 6/29/19 with back pain not relieved with home meds.  Spiked temp 6/30/19, RVP and BCx neg, received empiric Ceftriaxone.  Hb decreased to 6.5g/dL, however had retic count and was asymptomatic.  At /u outpatient was found to have improving Hb and reticulocytosis, however had symptoms of headache and dizziness.  Therefore received PRBCs on 7/6/19. Had abdominal pain and emesis when she moved to school and was seen in the ED at [place] 8/14 where she was found to be dehydrated.  She did not require any admission. She remained afebrile. Admitted 7/3-6/2023 with VOE of her lower back and b/l thighs a few days after her menses.  She initially ended up admitted to [facility] and had to be transferred here. She became febrile during the admission, w/u including CXR was negative.  Her anemia worsened to a Hb of 6g/dL on 7/6 and she received PRBCs and was d/c. [de-identified] : Doing well. Afebrile. No recent illness. No VOE.  No ED visits or admissions since last visit. Reports adherence with Hydroxyurea. Normal menses, monthly, lasts 5 days, 2 ppd. Tolerable pain.  Attends [employer].  Still pursuing nursing.  School went well.  Will be returning next week for her final year.  Completed an internship at . HEADSS:  feels safe at school and home; no smoking, drinking, vaping or illicit drug use; not sexually active.

## 2024-08-06 NOTE — CONSULT LETTER
[Dear  ___] : Dear  [unfilled], [Courtesy Letter:] : I had the pleasure of seeing your patient, [unfilled], in my office today. [Please see my note below.] : Please see my note below. [Consult Closing:] : Thank you very much for allowing me to participate in the care of this patient.  If you have any questions, please do not hesitate to contact me. [Sincerely,] : Sincerely, [FreeTextEntry2] : [place] [address] Telephone #: [phone]  [FreeTextEntry3] : Jaz Moran MD, MPH, FAAP Attending Physician [facility] Hematology /Oncology and Cellular Therapy  of Pediatrics [employer] at [facility]

## 2024-08-06 NOTE — CONSULT LETTER
[Dear  ___] : Dear  [unfilled], [Courtesy Letter:] : I had the pleasure of seeing your patient, [unfilled], in my office today. [Please see my note below.] : Please see my note below. [Consult Closing:] : Thank you very much for allowing me to participate in the care of this patient.  If you have any questions, please do not hesitate to contact me. [Sincerely,] : Sincerely, [FreeTextEntry2] : [place] [address] Telephone #: [phone]  [FreeTextEntry3] : Jaz Moran MD, MPH, FAAP Attending Physician [facility] Hematology /Oncology and Cellular Therapy  of Pediatrics [employer] at [facility]

## 2024-08-06 NOTE — HISTORY OF PRESENT ILLNESS
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  She has occasional VOEs.  She started Hydroxyurea in 10/2015.  She has a history of LVH and follows with Cardiology.  Menarche at 12yo.  All 3 siblings are not an HLA match.  Met with transplant team, potential donor no longer available. Admitted 6/29/19 with back pain not relieved with home meds.  Spiked temp 6/30/19, RVP and BCx neg, received empiric Ceftriaxone.  Hb decreased to 6.5g/dL, however had retic count and was asymptomatic.  At /u outpatient was found to have improving Hb and reticulocytosis, however had symptoms of headache and dizziness.  Therefore received PRBCs on 7/6/19. Had abdominal pain and emesis when she moved to school and was seen in the ED at [place] 8/14 where she was found to be dehydrated.  She did not require any admission. She remained afebrile. Admitted 7/3-6/2023 with VOE of her lower back and b/l thighs a few days after her menses.  She initially ended up admitted to [facility] and had to be transferred here. She became febrile during the admission, w/u including CXR was negative.  Her anemia worsened to a Hb of 6g/dL on 7/6 and she received PRBCs and was d/c. [de-identified] : Doing well. Afebrile. No recent illness. No VOE.  No ED visits or admissions since last visit. Reports adherence with Hydroxyurea. Normal menses, monthly, lasts 5 days, 2 ppd. Tolerable pain.  Attends [employer].  Still pursuing nursing.  School went well.  Will be returning next week for her final year.  Completed an internship at . HEADSS:  feels safe at school and home; no smoking, drinking, vaping or illicit drug use; not sexually active.

## 2024-08-06 NOTE — PHYSICAL EXAM
[No focal deficits] : no focal deficits [Normal] : affect appropriate [de-identified] : brisk CR, 2+ peripheral pulses

## 2024-08-08 ENCOUNTER — APPOINTMENT (OUTPATIENT)
Dept: PEDIATRIC CARDIOLOGY | Facility: CLINIC | Age: 19
End: 2024-08-08

## 2024-08-08 ENCOUNTER — APPOINTMENT (OUTPATIENT)
Dept: PEDIATRIC PULMONARY CYSTIC FIB | Facility: CLINIC | Age: 19
End: 2024-08-08

## 2024-08-08 PROBLEM — Z87.09 HISTORY OF CHRONIC OBSTRUCTIVE LUNG DISEASE: Status: RESOLVED | Noted: 2020-03-21 | Resolved: 2024-08-08

## 2024-08-08 PROCEDURE — 99204 OFFICE O/P NEW MOD 45 MIN: CPT | Mod: 25

## 2024-08-08 PROCEDURE — 93306 TTE W/DOPPLER COMPLETE: CPT

## 2024-08-08 PROCEDURE — 94726 PLETHYSMOGRAPHY LUNG VOLUMES: CPT

## 2024-08-08 PROCEDURE — 94664 DEMO&/EVAL PT USE INHALER: CPT

## 2024-08-08 PROCEDURE — 94729 DIFFUSING CAPACITY: CPT

## 2024-08-08 PROCEDURE — 99214 OFFICE O/P EST MOD 30 MIN: CPT | Mod: 25

## 2024-08-08 PROCEDURE — 94010 BREATHING CAPACITY TEST: CPT

## 2024-08-08 NOTE — REASON FOR VISIT
[Routine Follow-Up] : a routine follow-up visit for [Patient] : patient [Medical Records] : medical records [Mother] : mother [Other: _____] : [unfilled] [FreeTextEntry3] : Sickle Cell anemia

## 2024-08-08 NOTE — PHYSICAL EXAM
[Well Nourished] : well nourished [Well Developed] : well developed [Well Groomed] : well groomed [Alert] : ~L alert [Active] : active [Normal Breathing Pattern] : normal breathing pattern [No Respiratory Distress] : no respiratory distress [No Nasal Drainage] : no nasal drainage [No Oral Cyanosis] : no oral cyanosis [Absence Of Retractions] : absence of retractions [Symmetric] : symmetric [Good Expansion] : good expansion [No Acc Muscle Use] : no accessory muscle use [Good aeration to bases] : good aeration to bases [Equal Breath Sounds] : equal breath sounds bilaterally [No Wheezing] : no wheezing [Normal Sinus Rhythm] : normal sinus rhythm [Soft, Non-Tender] : soft, non-tender [Full ROM] : full range of motion [Capillary Refill < 2 secs] : capillary refill less than two seconds [No Cyanosis] : no cyanosis [No Kyphoscoliosis] : no kyphoscoliosis [Abnormal Walk] : normal gait [Alert and  Oriented] : alert and oriented [No Rashes] : no rashes

## 2024-08-08 NOTE — HISTORY OF PRESENT ILLNESS
CHIEF COMPLAINT:    HPI: 80 y.o.  female with past medical history of rapid atrial fibrillation, fluid overload, status post severe COVID-19 infection, history of drug-eluting stent therapy to the RCA, LAD, first diagonal branch, known occlusion of the left circumflex with congestive heart failure, with preserved ejection fraction, carotid disease. Patient came to the ED complaining of BRBPR, was found to be profoundly anemic  had 3 units PRBC transfusions thus far since admission, admitted for GIB. Pt scheduled for EGD/colonoscopy. Cardiology service consulted for preoperative assessment, Patient denies cardiac symptoms at this time.     PAST MEDICAL & SURGICAL HISTORY:  HTN (hypertension)      HLD (hyperlipidemia)      CAD (coronary artery disease)      COPD (chronic obstructive pulmonary disease)      S/P  section          Allergies    No Known Allergies    Intolerances        Home meds   amiodarone (PACERONE) 200 MG tablet	Take 1 tablet (200 mg total) by mouth one time daily..	  apixaban (ELIQUIS) 5 MG TABS	Take 1 tablet (5 mg total) by mouth 2 times a day scheduled..  aspirin 81 MG EC tablet	Take 81 mg by mouth one time daily..  atorvastatin (LIPITOR) 80 MG tablet	TAKE 1 TABLET EVERY DAY  clopidogrel (PLAVIX) 75 MG tablet	TAKE 1 TABLET EVERY DAY  metoprolol succinate ER (TOPROL-XL) 200 MG 24 hr tablet	Take 1 tablet (200 mg total) by mouth one time daily..  nystatin (MYCOSTATIN) powder	Apply topically 2 times a day scheduled..  potassium chloride 20 MEQ TBCR	Take 20 mEq by mouth every other day..  spironolactone (ALDACTONE) 25 MG tablet	Take 1 tablet (25 mg total) by mouth one time daily..  torsemide (DEMADEX) 100 MG tablet	Take 1 tablet (100 mg total) by mouth one time daily..        MEDICATIONS  (STANDING):  aMIOdarone    Tablet 200 milliGRAM(s) Oral daily  atorvastatin 80 milliGRAM(s) Oral at bedtime  metoprolol succinate  milliGRAM(s) Oral daily  pantoprazole  Injectable 40 milliGRAM(s) IV Push every 12 hours  sodium chloride 0.9%. 1000 milliLiter(s) (65 mL/Hr) IV Continuous <Continuous>  tiotropium 2.5 MICROgram(s)/olodaterol 2.5 MICROgram(s) (STIOLTO) Inhaler 2 Puff(s) Inhalation daily    MEDICATIONS  (PRN):  acetaminophen     Tablet .. 650 milliGRAM(s) Oral every 6 hours PRN Temp greater or equal to 38C (100.4F), Mild Pain (1 - 3)  aluminum hydroxide/magnesium hydroxide/simethicone Suspension 30 milliLiter(s) Oral every 4 hours PRN Dyspepsia  melatonin 3 milliGRAM(s) Oral at bedtime PRN Insomnia  ondansetron Injectable 4 milliGRAM(s) IV Push every 8 hours PRN Nausea and/or Vomiting      FAMILY HISTORY:  FHx: heart disease (Sibling)        ***No family history of premature coronary artery disease or sudden cardiac death    SOCIAL HISTORY:  Smoking-  Alcohol-  Illicit Drug use-    REVIEW OF SYSTEMS:  Constitutional: [ ] fever, [ ]weight loss,  [ ]fatigue  Eyes: [ ] visual changes  Respiratory: [ ]shortness of breath;  [ ] cough, [ ]wheezing, [ ]chills, [ ]hemoptysis  Cardiovascular: [ ] chest pain, [ ]palpitations, [ ]dizziness,  [ ]leg swelling [ ]syncope  Gastrointestinal: [ ] abdominal pain, [ ]nausea, [ ]vomiting,  [ ]diarrhea   Genitourinary: [ ] dysuria, [ ] hematuria  Neurologic: [ ] headaches [ ] tremors  [ ] weakness [ ] lightheadedness  Skin: [ ] itching, [ ]burning, [ ] rashes  Endocrine: [ ] heat or cold intolerance  Musculoskeletal: [ ] joint pain or swelling; [ ] muscle, back, or extremity pain  Psychiatric: [ ] depression, [ ]anxiety, [ ]mood swings, or [ ]difficulty sleeping  Hematologic: [ ] easy bruising, [ ] bleeding gums     [ x] All others negative	  [ ] Unable to obtain    Vital Signs Last 24 Hrs  T(C): 36.8 (29 Dec 2022 10:52), Max: 37.1 (28 Dec 2022 17:01)  T(F): 98.2 (29 Dec 2022 10:52), Max: 98.8 (28 Dec 2022 17:01)  HR: 61 (29 Dec 2022 10:52) (56 - 74)  BP: 100/52 (29 Dec 2022 10:52) (97/62 - 116/68)  BP(mean): --  RR: 18 (29 Dec 2022 10:52) (18 - 18)  SpO2: 95% (29 Dec 2022 06:05) (95% - 97%)    Parameters below as of 29 Dec 2022 06:05  Patient On (Oxygen Delivery Method): nasal cannula  O2 Flow (L/min): 2    I&O's Summary    28 Dec 2022 07:01  -  29 Dec 2022 07:00  --------------------------------------------------------  IN: 312 mL / OUT: 0 mL / NET: 312 mL        PHYSICAL EXAM:  General: No acute distress  HEENT: EOMI  Neck:  No JVD  Lungs: Clear to auscultation bilaterally; No rales or wheezing  Heart: Regular rate and rhythm; No murmurs, rubs, or gallops  Abdomen: soft, non tender, non distended   Extremities: warm, no edema   Nervous system:  Alert & Oriented X3  Psychiatric: Normal affect  Skin: No rashes or lesions    LABS:      138  |  102  |  30.6<H>  ----------------------------<  84  3.9   |  25.0  |  1.39<H>    Ca    7.9<L>      29 Dec 2022 08:45  Mg     2.4         TPro  5.5<L>  /  Alb  3.1<L>  /  TBili  0.6  /  DBili  x   /  AST  20  /  ALT  20  /  AlkPhos  191<H>      Creatinine Trend: 1.39<--, 1.84<--, 1.66<--                        9.4    10.11 )-----------( 309      ( 29 Dec 2022 08:45 )             30.4     PT/INR - ( 29 Dec 2022 08:45 )   PT: 16.4 sec;   INR: 1.41 ratio         PTT - ( 29 Dec 2022 08:45 )  PTT:37.5 sec    Lipid Panel:   Cardiac Enzymes:           RADIOLOGY:    ECG [my interpretation]:    TELEMETRY:    Nuclear stress test 20; large fixed defect involving the lateral wall of severe intensity consistent with antecedent infarction, no significant residual ischemia, ejection fraction 70%.  Echocardiogram 8/10/20; ejection fraction 50%, moderate mitral regurgitation, moderate tricuspid regurgitation.  Carotid ultrasound 8/10/20; 50-65% bilateral carotid stenosis.  Holter monitor 20; sinus rhythm with no additional significant arrhythmia.    CATHETERIZATION: [Cough] : coughing [Wheezing] : wheezing [Nasal Passage Blockage (Stuffiness)] : nasal congestion [Nasal Discharge From Both Nostrils] : runny nose [Snoring] : snoring [(# ___ in the past year)] : hospitalized [unfilled] times in the past year [0 x/month] : 0 x/month [None] : None [< or = 2 days/wk] : < than or = 2 days/week [0 - 1/year] : 0 - 1/year CHIEF COMPLAINT:    HPI: 80 y.o.  female with past medical history of rapid atrial fibrillation, fluid overload, status post severe COVID-19 infection, history of drug-eluting stent therapy to the RCA, LAD, first diagonal branch, known occlusion of the left circumflex with congestive heart failure, with preserved ejection fraction, carotid disease. Patient came to the ED complaining of BRBPR, was found to be profoundly anemic  had 3 units PRBC transfusions thus far since admission, admitted for GIB. Pt s/p EGD/colonoscopy. Cardiology service consulted for recommendations regarding long term anticoagulation and antiplatelet therapy.  Patient denies cardiac symptoms at this time.     PAST MEDICAL & SURGICAL HISTORY:  HTN (hypertension)      HLD (hyperlipidemia)      CAD (coronary artery disease)      COPD (chronic obstructive pulmonary disease)      S/P  section          Allergies    No Known Allergies    Intolerances        Home meds   amiodarone (PACERONE) 200 MG tablet	Take 1 tablet (200 mg total) by mouth one time daily..	  apixaban (ELIQUIS) 5 MG TABS	Take 1 tablet (5 mg total) by mouth 2 times a day scheduled..  aspirin 81 MG EC tablet	Take 81 mg by mouth one time daily..  atorvastatin (LIPITOR) 80 MG tablet	TAKE 1 TABLET EVERY DAY  clopidogrel (PLAVIX) 75 MG tablet	TAKE 1 TABLET EVERY DAY  metoprolol succinate ER (TOPROL-XL) 200 MG 24 hr tablet	Take 1 tablet (200 mg total) by mouth one time daily..  nystatin (MYCOSTATIN) powder	Apply topically 2 times a day scheduled..  potassium chloride 20 MEQ TBCR	Take 20 mEq by mouth every other day..  spironolactone (ALDACTONE) 25 MG tablet	Take 1 tablet (25 mg total) by mouth one time daily..  torsemide (DEMADEX) 100 MG tablet	Take 1 tablet (100 mg total) by mouth one time daily..        MEDICATIONS  (STANDING):  aMIOdarone    Tablet 200 milliGRAM(s) Oral daily  atorvastatin 80 milliGRAM(s) Oral at bedtime  metoprolol succinate  milliGRAM(s) Oral daily  pantoprazole  Injectable 40 milliGRAM(s) IV Push every 12 hours  sodium chloride 0.9%. 1000 milliLiter(s) (65 mL/Hr) IV Continuous <Continuous>  tiotropium 2.5 MICROgram(s)/olodaterol 2.5 MICROgram(s) (STIOLTO) Inhaler 2 Puff(s) Inhalation daily    MEDICATIONS  (PRN):  acetaminophen     Tablet .. 650 milliGRAM(s) Oral every 6 hours PRN Temp greater or equal to 38C (100.4F), Mild Pain (1 - 3)  aluminum hydroxide/magnesium hydroxide/simethicone Suspension 30 milliLiter(s) Oral every 4 hours PRN Dyspepsia  melatonin 3 milliGRAM(s) Oral at bedtime PRN Insomnia  ondansetron Injectable 4 milliGRAM(s) IV Push every 8 hours PRN Nausea and/or Vomiting      FAMILY HISTORY:  FHx: heart disease (Sibling)        ***No family history of premature coronary artery disease or sudden cardiac death    SOCIAL HISTORY:  Smoking-  Alcohol-  Illicit Drug use-    REVIEW OF SYSTEMS:  Constitutional: [ ] fever, [ ]weight loss,  [ ]fatigue  Eyes: [ ] visual changes  Respiratory: [ ]shortness of breath;  [ ] cough, [ ]wheezing, [ ]chills, [ ]hemoptysis  Cardiovascular: [ ] chest pain, [ ]palpitations, [ ]dizziness,  [ ]leg swelling [ ]syncope  Gastrointestinal: [ ] abdominal pain, [ ]nausea, [ ]vomiting,  [ ]diarrhea [X ] BRBPR  Genitourinary: [ ] dysuria, [ ] hematuria  Neurologic: [ ] headaches [ ] tremors  [ ] weakness [ ] lightheadedness  Skin: [ ] itching, [ ]burning, [ ] rashes  Endocrine: [ ] heat or cold intolerance  Musculoskeletal: [ ] joint pain or swelling; [ ] muscle, back, or extremity pain  Psychiatric: [ ] depression, [ ]anxiety, [ ]mood swings, or [ ]difficulty sleeping  Hematologic: [ ] easy bruising, [ ] bleeding gums     [ x] All others negative	  [ ] Unable to obtain    Vital Signs Last 24 Hrs  T(C): 36.8 (29 Dec 2022 10:52), Max: 37.1 (28 Dec 2022 17:01)  T(F): 98.2 (29 Dec 2022 10:52), Max: 98.8 (28 Dec 2022 17:01)  HR: 61 (29 Dec 2022 10:52) (56 - 74)  BP: 100/52 (29 Dec 2022 10:52) (97/62 - 116/68)  BP(mean): --  RR: 18 (29 Dec 2022 10:52) (18 - 18)  SpO2: 95% (29 Dec 2022 06:05) (95% - 97%)    Parameters below as of 29 Dec 2022 06:05  Patient On (Oxygen Delivery Method): nasal cannula  O2 Flow (L/min): 2    I&O's Summary    28 Dec 2022 07:01  -  29 Dec 2022 07:00  --------------------------------------------------------  IN: 312 mL / OUT: 0 mL / NET: 312 mL        PHYSICAL EXAM:  General: No acute distress  HEENT: EOMI  Neck:  No JVD  Lungs: Clear to auscultation bilaterally; No rales or wheezing  Heart: Regular rate and rhythm; No murmurs, rubs, or gallops  Abdomen: soft, non tender, non distended   Extremities: warm, no edema   Nervous system:  Alert & Oriented X3  Psychiatric: Normal affect  Skin: No rashes or lesions    LABS:      138  |  102  |  30.6<H>  ----------------------------<  84  3.9   |  25.0  |  1.39<H>    Ca    7.9<L>      29 Dec 2022 08:45  Mg     2.4         TPro  5.5<L>  /  Alb  3.1<L>  /  TBili  0.6  /  DBili  x   /  AST  20  /  ALT  20  /  AlkPhos  191<H>      Creatinine Trend: 1.39<--, 1.84<--, 1.66<--                        9.4    10.11 )-----------( 309      ( 29 Dec 2022 08:45 )             30.4     PT/INR - ( 29 Dec 2022 08:45 )   PT: 16.4 sec;   INR: 1.41 ratio         PTT - ( 29 Dec 2022 08:45 )  PTT:37.5 sec    Lipid Panel:   Cardiac Enzymes:           RADIOLOGY:    ECG [my interpretation]:    TELEMETRY:    Nuclear stress test 20; large fixed defect involving the lateral wall of severe intensity consistent with antecedent infarction, no significant residual ischemia, ejection fraction 70%.  Echocardiogram 8/10/20; ejection fraction 50%, moderate mitral regurgitation, moderate tricuspid regurgitation.  Carotid ultrasound 8/10/20; 50-65% bilateral carotid stenosis.  Holter monitor 20; sinus rhythm with no additional significant arrhythmia.    CATHETERIZATION: [> or = 20] : > than or = 20 [FreeTextEntry1] : Sickle cell disease, followed by hematology, on hydroxyurea and folic acid.  H/o acute chest syndrome in  and .   FOLLOW UP: Aug 08, 2024 Last seen (Dec 2022, Dr. James) - Recs: Breo 1 puff once a day. albuterol PRN.    Interval hx: - Poor compliance with ICS. Breo was never started.  - Pt brought inhaler with her today. Flovent 44mcg. Reportedly using 1 puff BID infrequently. Last used more than one week ago. Not using spacer. (Medication noted to have  in ).  - Denies any respiratory symptoms.  - Reports compliance with hydroxyurea and folic acid.  - [facility]ends [facility] [employer] [facility] [place]  - No interval oral steroids. - Pain crisis 2023. [place] [facility] [place]  - ACS  and      Daily meds:  hydroxyurea and folic acid.  Compliance with Spacer: NO Rescue meds: Albuterol PRN, not used for several months.   Baseline daytime cough, SOB or wheeze: denies  Baseline nocturnal cough, SOB or wheeze: denies  Exertional cough, SOB or wheeze: denies  REINALDO sx: denies  TESSA sx: denies  Allergic rhinitis symptoms: denies   Modified Asthma Predictive Index (mAPI): 4 wheezing illnesses AND 1 major criteria Parental asthma: NO  atopic dermatitis: NO Aeroallergen sensitization suspected: NO   OR   2 minor criteria Food sensitization: NO Peripheral blood eosinophilia =4%: Wheezing apart from colds:  NO [de-identified] : hydroxyurea, folic acid for sickle cell anemia [Shortness of Breath] : no shortness of breath [Cough] : no cough [Wheezing] : no wheezing

## 2024-08-08 NOTE — REVIEW OF SYSTEMS
[NI] : Genitourinary  [Nl] : Endocrine [Snoring] : snoring [Heart Disease] : heart disease [Frequent URIs] : no frequent upper respiratory infections [Nasal Congestion] : no nasal congestion [Sinus Problems] : no sinus problems [Wheezing] : no wheezing [Cough] : no cough [Pneumonia] : no pneumonia [Reflux] : no reflux [Eczema] : no ezcema [FreeTextEntry5] : history of LVH, most recent cardiac eval earlier today, unremarkable. Routine yearly f/u advised.  [de-identified] : sickle cell anemia, s/p multiple blood transfusions [Immunizations are up to date] : Immunizations are up to date

## 2024-08-09 DIAGNOSIS — Z23 ENCOUNTER FOR IMMUNIZATION: ICD-10-CM

## 2024-08-09 DIAGNOSIS — R80.9 PROTEINURIA, UNSPECIFIED: ICD-10-CM

## 2024-08-09 DIAGNOSIS — J44.9 CHRONIC OBSTRUCTIVE PULMONARY DISEASE, UNSPECIFIED: ICD-10-CM

## 2024-08-09 DIAGNOSIS — J45.30 MILD PERSISTENT ASTHMA, UNCOMPLICATED: ICD-10-CM

## 2024-08-09 DIAGNOSIS — Z51.81 ENCOUNTER FOR THERAPEUTIC DRUG LEVEL MONITORING: ICD-10-CM

## 2024-08-09 DIAGNOSIS — R79.89 OTHER SPECIFIED ABNORMAL FINDINGS OF BLOOD CHEMISTRY: ICD-10-CM

## 2024-08-09 DIAGNOSIS — D57.1 SICKLE-CELL DISEASE WITHOUT CRISIS: ICD-10-CM

## 2024-08-09 DIAGNOSIS — Z79.64 LONG TERM (CURRENT) USE OF MYELOSUPPRESSIVE AGENT: ICD-10-CM

## 2024-08-09 DIAGNOSIS — Z71.87 ENCOUNTER FOR PEDIATRIC-TO-ADULT TRANSITION COUNSELING: ICD-10-CM

## 2024-08-09 NOTE — REASON FOR VISIT
[Initial Consultation] : an initial consultation for [Mother] : mother [Family Member] : family member [Follow-Up] : a follow-up visit for [Patient] : patient [FreeTextEntry3] : sickle cell disease

## 2024-08-09 NOTE — CARDIOLOGY SUMMARY
[Today's Date] : [unfilled] [FreeTextEntry1] : Normal sinus rhythm.  Normal ECG. [FreeTextEntry2] : 1. Compared to the previous echocardiogram; no significant change. 2. {S,D,S\} Situs solitus, D-ventricular looping, normally related great arteries. 3. Globular left ventricle and mildly dilated left ventricle. 4. Normal left ventricular systolic function. 5. Normal right ventricular morphology with qualitatively normal size and systolic function. 6. No evidence of pulmonary hypertension. 7. Right ventricular systolic pressure estimate = 22.3 mmHg (estimated right atrial pressure of 5 mmHg). 8. No pericardial effusion.

## 2024-08-09 NOTE — CONSULT LETTER
[Today's Date] : [unfilled] [Dear  ___:] : Dear Dr. [unfilled]: [Consult - Single Provider] : Thank you very much for allowing me to participate in the care of this patient. If you have any questions, please do not hesitate to contact me. [Sincerely,] : Sincerely, [DrDevyn  ___] : Dr. HERBERT [FreeTextEntry4] : Dr Carrillo [FreeTextEntry5] : [address] [facility]  [FreeTextEntry6] : [facility] [id number]

## 2024-08-09 NOTE — CONSULT LETTER
[Today's Date] : [unfilled] [Dear  ___:] : Dear Dr. [unfilled]: [Consult - Single Provider] : Thank you very much for allowing me to participate in the care of this patient. If you have any questions, please do not hesitate to contact me. [Sincerely,] : Sincerely, [DrDevyn  ___] : Dr. HERBERT [FreeTextEntry4] : Dr Carrillo [FreeTextEntry5] : [address] [facility]  [FreeTextEntry6] : [facility] [id number]

## 2024-11-26 ENCOUNTER — APPOINTMENT (OUTPATIENT)
Dept: PEDIATRIC PULMONARY CYSTIC FIB | Facility: CLINIC | Age: 19
End: 2024-11-26

## 2024-11-27 ENCOUNTER — APPOINTMENT (OUTPATIENT)
Dept: OPHTHALMOLOGY | Facility: CLINIC | Age: 19
End: 2024-11-27

## 2024-12-19 ENCOUNTER — NON-APPOINTMENT (OUTPATIENT)
Age: 19
End: 2024-12-19

## 2024-12-20 ENCOUNTER — APPOINTMENT (OUTPATIENT)
Dept: INTERNAL MEDICINE | Facility: CLINIC | Age: 19
End: 2024-12-20

## 2025-05-19 ENCOUNTER — INPATIENT (INPATIENT)
Facility: HOSPITAL | Age: 20
LOS: 4 days | Discharge: HOME | End: 2025-05-24
Attending: STUDENT IN AN ORGANIZED HEALTH CARE EDUCATION/TRAINING PROGRAM | Admitting: STUDENT IN AN ORGANIZED HEALTH CARE EDUCATION/TRAINING PROGRAM
Payer: COMMERCIAL

## 2025-05-19 VITALS
TEMPERATURE: 99 F | OXYGEN SATURATION: 97 % | SYSTOLIC BLOOD PRESSURE: 89 MMHG | DIASTOLIC BLOOD PRESSURE: 53 MMHG | HEART RATE: 101 BPM | RESPIRATION RATE: 19 BRPM | WEIGHT: 106.04 LBS

## 2025-05-19 DIAGNOSIS — D57.00 HB-SS DISEASE WITH CRISIS, UNSPECIFIED: ICD-10-CM

## 2025-05-19 LAB
ALBUMIN SERPL ELPH-MCNC: 4.5 G/DL — SIGNIFICANT CHANGE UP (ref 3.3–5)
ALP SERPL-CCNC: 79 U/L — SIGNIFICANT CHANGE UP (ref 40–120)
ALT FLD-CCNC: 35 U/L — HIGH (ref 4–33)
ANION GAP SERPL CALC-SCNC: 13 MMOL/L — SIGNIFICANT CHANGE UP (ref 7–14)
AST SERPL-CCNC: 72 U/L — HIGH (ref 4–32)
BASOPHILS # BLD AUTO: 0.1 K/UL — SIGNIFICANT CHANGE UP (ref 0–0.2)
BASOPHILS NFR BLD AUTO: 0.6 % — SIGNIFICANT CHANGE UP (ref 0–2)
BILIRUB SERPL-MCNC: 1.6 MG/DL — HIGH (ref 0.2–1.2)
BLD GP AB SCN SERPL QL: NEGATIVE — SIGNIFICANT CHANGE UP
BLOOD GAS VENOUS COMPREHENSIVE RESULT: SIGNIFICANT CHANGE UP
BUN SERPL-MCNC: 8 MG/DL — SIGNIFICANT CHANGE UP (ref 7–23)
CALCIUM SERPL-MCNC: 9.4 MG/DL — SIGNIFICANT CHANGE UP (ref 8.4–10.5)
CHLORIDE SERPL-SCNC: 102 MMOL/L — SIGNIFICANT CHANGE UP (ref 98–107)
CO2 SERPL-SCNC: 20 MMOL/L — LOW (ref 22–31)
CREAT SERPL-MCNC: 0.62 MG/DL — SIGNIFICANT CHANGE UP (ref 0.5–1.3)
EGFR: 130 ML/MIN/1.73M2 — SIGNIFICANT CHANGE UP
EGFR: 130 ML/MIN/1.73M2 — SIGNIFICANT CHANGE UP
EOSINOPHIL # BLD AUTO: 0.31 K/UL — SIGNIFICANT CHANGE UP (ref 0–0.5)
EOSINOPHIL NFR BLD AUTO: 1.9 % — SIGNIFICANT CHANGE UP (ref 0–6)
GLUCOSE SERPL-MCNC: 102 MG/DL — HIGH (ref 70–99)
HCG SERPL-ACNC: <1 MIU/ML — SIGNIFICANT CHANGE UP
HCT VFR BLD CALC: 18.4 % — CRITICAL LOW (ref 34.5–45)
HGB BLD-MCNC: 6.7 G/DL — CRITICAL LOW (ref 11.5–15.5)
IANC: 10.39 K/UL — HIGH (ref 1.8–7.4)
IMM GRANULOCYTES NFR BLD AUTO: 3.9 % — HIGH (ref 0–0.9)
LYMPHOCYTES # BLD AUTO: 18.1 % — SIGNIFICANT CHANGE UP (ref 13–44)
LYMPHOCYTES # BLD AUTO: 2.89 K/UL — SIGNIFICANT CHANGE UP (ref 1–3.3)
MCHC RBC-ENTMCNC: 33.5 PG — SIGNIFICANT CHANGE UP (ref 27–34)
MCHC RBC-ENTMCNC: 36.4 G/DL — HIGH (ref 32–36)
MCV RBC AUTO: 92 FL — SIGNIFICANT CHANGE UP (ref 80–100)
MONOCYTES # BLD AUTO: 1.66 K/UL — HIGH (ref 0–0.9)
MONOCYTES NFR BLD AUTO: 10.4 % — SIGNIFICANT CHANGE UP (ref 2–14)
NEUTROPHILS # BLD AUTO: 10.39 K/UL — HIGH (ref 1.8–7.4)
NEUTROPHILS NFR BLD AUTO: 65.1 % — SIGNIFICANT CHANGE UP (ref 43–77)
NRBC # BLD AUTO: 1.07 K/UL — HIGH (ref 0–0)
NRBC # FLD: 1.07 K/UL — HIGH (ref 0–0)
NRBC BLD AUTO-RTO: 7 /100 WBCS — HIGH (ref 0–0)
PLATELET # BLD AUTO: 364 K/UL — SIGNIFICANT CHANGE UP (ref 150–400)
POTASSIUM SERPL-MCNC: 4.1 MMOL/L — SIGNIFICANT CHANGE UP (ref 3.5–5.3)
POTASSIUM SERPL-SCNC: 4.1 MMOL/L — SIGNIFICANT CHANGE UP (ref 3.5–5.3)
PROT SERPL-MCNC: 7 G/DL — SIGNIFICANT CHANGE UP (ref 6–8.3)
RBC # BLD: 2 M/UL — LOW (ref 3.8–5.2)
RBC # BLD: 2 M/UL — LOW (ref 3.8–5.2)
RBC # FLD: 23.1 % — HIGH (ref 10.3–14.5)
RETICS #: 277.8 K/UL — HIGH (ref 25–125)
RETICS/RBC NFR: 13.9 % — HIGH (ref 0.5–2.5)
RH IG SCN BLD-IMP: POSITIVE — SIGNIFICANT CHANGE UP
SODIUM SERPL-SCNC: 135 MMOL/L — SIGNIFICANT CHANGE UP (ref 135–145)
TROPONIN T, HIGH SENSITIVITY RESULT: <6 NG/L — SIGNIFICANT CHANGE UP
WBC # BLD: 15.98 K/UL — HIGH (ref 3.8–10.5)
WBC # FLD AUTO: 15.98 K/UL — HIGH (ref 3.8–10.5)

## 2025-05-19 PROCEDURE — 71046 X-RAY EXAM CHEST 2 VIEWS: CPT | Mod: 26

## 2025-05-19 PROCEDURE — 99285 EMERGENCY DEPT VISIT HI MDM: CPT

## 2025-05-19 RX ORDER — KETOROLAC TROMETHAMINE 30 MG/ML
15 INJECTION, SOLUTION INTRAMUSCULAR; INTRAVENOUS ONCE
Refills: 0 | Status: DISCONTINUED | OUTPATIENT
Start: 2025-05-19 | End: 2025-05-19

## 2025-05-19 RX ORDER — OXYCODONE HYDROCHLORIDE 30 MG/1
1 TABLET ORAL
Qty: 10 | Refills: 0
Start: 2025-05-19 | End: 2025-05-20

## 2025-05-19 RX ORDER — ACETAMINOPHEN 500 MG/5ML
725 LIQUID (ML) ORAL ONCE
Refills: 0 | Status: COMPLETED | OUTPATIENT
Start: 2025-05-19 | End: 2025-05-19

## 2025-05-19 RX ADMIN — Medication 4 MILLIGRAM(S): at 17:32

## 2025-05-19 RX ADMIN — Medication 4 MILLIGRAM(S): at 16:02

## 2025-05-19 RX ADMIN — Medication 4 MILLIGRAM(S): at 21:43

## 2025-05-19 RX ADMIN — KETOROLAC TROMETHAMINE 15 MILLIGRAM(S): 30 INJECTION, SOLUTION INTRAMUSCULAR; INTRAVENOUS at 16:16

## 2025-05-19 RX ADMIN — KETOROLAC TROMETHAMINE 15 MILLIGRAM(S): 30 INJECTION, SOLUTION INTRAMUSCULAR; INTRAVENOUS at 21:43

## 2025-05-19 RX ADMIN — Medication 290 MILLIGRAM(S): at 17:32

## 2025-05-20 DIAGNOSIS — D57.00 HB-SS DISEASE WITH CRISIS, UNSPECIFIED: ICD-10-CM

## 2025-05-20 DIAGNOSIS — Z79.899 OTHER LONG TERM (CURRENT) DRUG THERAPY: ICD-10-CM

## 2025-05-20 DIAGNOSIS — Z29.9 ENCOUNTER FOR PROPHYLACTIC MEASURES, UNSPECIFIED: ICD-10-CM

## 2025-05-20 LAB
ADD ON TEST-SPECIMEN IN LAB: SIGNIFICANT CHANGE UP
ALBUMIN SERPL ELPH-MCNC: 4.3 G/DL — SIGNIFICANT CHANGE UP (ref 3.3–5)
ALP SERPL-CCNC: 80 U/L — SIGNIFICANT CHANGE UP (ref 40–120)
ALT FLD-CCNC: 35 U/L — HIGH (ref 4–33)
ANION GAP SERPL CALC-SCNC: 13 MMOL/L — SIGNIFICANT CHANGE UP (ref 7–14)
AST SERPL-CCNC: 68 U/L — HIGH (ref 4–32)
BASOPHILS # BLD AUTO: 0.09 K/UL — SIGNIFICANT CHANGE UP (ref 0–0.2)
BASOPHILS NFR BLD AUTO: 0.7 % — SIGNIFICANT CHANGE UP (ref 0–2)
BILIRUB SERPL-MCNC: 1.7 MG/DL — HIGH (ref 0.2–1.2)
BUN SERPL-MCNC: 8 MG/DL — SIGNIFICANT CHANGE UP (ref 7–23)
CALCIUM SERPL-MCNC: 9.2 MG/DL — SIGNIFICANT CHANGE UP (ref 8.4–10.5)
CHLORIDE SERPL-SCNC: 102 MMOL/L — SIGNIFICANT CHANGE UP (ref 98–107)
CO2 SERPL-SCNC: 18 MMOL/L — LOW (ref 22–31)
CREAT SERPL-MCNC: 0.59 MG/DL — SIGNIFICANT CHANGE UP (ref 0.5–1.3)
EGFR: 131 ML/MIN/1.73M2 — SIGNIFICANT CHANGE UP
EGFR: 131 ML/MIN/1.73M2 — SIGNIFICANT CHANGE UP
EOSINOPHIL # BLD AUTO: 0.18 K/UL — SIGNIFICANT CHANGE UP (ref 0–0.5)
EOSINOPHIL NFR BLD AUTO: 1.4 % — SIGNIFICANT CHANGE UP (ref 0–6)
GLUCOSE SERPL-MCNC: 104 MG/DL — HIGH (ref 70–99)
HCT VFR BLD CALC: 21.1 % — LOW (ref 34.5–45)
HGB BLD-MCNC: 7.6 G/DL — LOW (ref 11.5–15.5)
IANC: 9 K/UL — HIGH (ref 1.8–7.4)
IMM GRANULOCYTES NFR BLD AUTO: 3.7 % — HIGH (ref 0–0.9)
LDH SERPL L TO P-CCNC: 884 U/L — HIGH (ref 135–225)
LYMPHOCYTES # BLD AUTO: 1.53 K/UL — SIGNIFICANT CHANGE UP (ref 1–3.3)
LYMPHOCYTES # BLD AUTO: 12.1 % — LOW (ref 13–44)
MCHC RBC-ENTMCNC: 32.3 PG — SIGNIFICANT CHANGE UP (ref 27–34)
MCHC RBC-ENTMCNC: 36 G/DL — SIGNIFICANT CHANGE UP (ref 32–36)
MCV RBC AUTO: 89.8 FL — SIGNIFICANT CHANGE UP (ref 80–100)
MONOCYTES # BLD AUTO: 1.36 K/UL — HIGH (ref 0–0.9)
MONOCYTES NFR BLD AUTO: 10.8 % — SIGNIFICANT CHANGE UP (ref 2–14)
NEUTROPHILS # BLD AUTO: 9 K/UL — HIGH (ref 1.8–7.4)
NEUTROPHILS NFR BLD AUTO: 71.3 % — SIGNIFICANT CHANGE UP (ref 43–77)
NRBC # BLD AUTO: 1.31 K/UL — HIGH (ref 0–0)
NRBC # FLD: 1.31 K/UL — HIGH (ref 0–0)
NRBC BLD AUTO-RTO: 10 /100 WBCS — HIGH (ref 0–0)
PLATELET # BLD AUTO: 337 K/UL — SIGNIFICANT CHANGE UP (ref 150–400)
POTASSIUM SERPL-MCNC: 4 MMOL/L — SIGNIFICANT CHANGE UP (ref 3.5–5.3)
POTASSIUM SERPL-SCNC: 4 MMOL/L — SIGNIFICANT CHANGE UP (ref 3.5–5.3)
PROT SERPL-MCNC: 6.8 G/DL — SIGNIFICANT CHANGE UP (ref 6–8.3)
RBC # BLD: 2.35 M/UL — LOW (ref 3.8–5.2)
RBC # BLD: 2.35 M/UL — LOW (ref 3.8–5.2)
RBC # FLD: 20.7 % — HIGH (ref 10.3–14.5)
RETICS #: 353.4 K/UL — HIGH (ref 25–125)
RETICS/RBC NFR: 15 % — HIGH (ref 0.5–2.5)
SODIUM SERPL-SCNC: 133 MMOL/L — LOW (ref 135–145)
WBC # BLD: 12.63 K/UL — HIGH (ref 3.8–10.5)
WBC # FLD AUTO: 12.63 K/UL — HIGH (ref 3.8–10.5)

## 2025-05-20 PROCEDURE — 12345: CPT | Mod: NC

## 2025-05-20 PROCEDURE — 99223 1ST HOSP IP/OBS HIGH 75: CPT

## 2025-05-20 RX ORDER — HYDROXYUREA 500 MG/1
1500 CAPSULE ORAL DAILY
Refills: 0 | Status: DISCONTINUED | OUTPATIENT
Start: 2025-05-20 | End: 2025-05-24

## 2025-05-20 RX ORDER — FOLIC ACID 1 MG/1
1 TABLET ORAL DAILY
Refills: 0 | Status: DISCONTINUED | OUTPATIENT
Start: 2025-05-20 | End: 2025-05-24

## 2025-05-20 RX ORDER — MELATONIN 5 MG
3 TABLET ORAL AT BEDTIME
Refills: 0 | Status: DISCONTINUED | OUTPATIENT
Start: 2025-05-20 | End: 2025-05-24

## 2025-05-20 RX ORDER — ONDANSETRON HCL/PF 4 MG/2 ML
4 VIAL (ML) INJECTION EVERY 8 HOURS
Refills: 0 | Status: DISCONTINUED | OUTPATIENT
Start: 2025-05-20 | End: 2025-05-24

## 2025-05-20 RX ORDER — HYDROMORPHONE/SOD CHLOR,ISO/PF 2 MG/10 ML
30 SYRINGE (ML) INJECTION
Refills: 0 | Status: DISCONTINUED | OUTPATIENT
Start: 2025-05-20 | End: 2025-05-23

## 2025-05-20 RX ORDER — SODIUM CHLORIDE 9 G/1000ML
1000 INJECTION, SOLUTION INTRAVENOUS
Refills: 0 | Status: DISCONTINUED | OUTPATIENT
Start: 2025-05-20 | End: 2025-05-22

## 2025-05-20 RX ORDER — NALOXONE HYDROCHLORIDE 0.4 MG/ML
0.1 INJECTION, SOLUTION INTRAMUSCULAR; INTRAVENOUS; SUBCUTANEOUS
Refills: 0 | Status: DISCONTINUED | OUTPATIENT
Start: 2025-05-20 | End: 2025-05-24

## 2025-05-20 RX ORDER — ACETAMINOPHEN 500 MG/5ML
650 LIQUID (ML) ORAL EVERY 6 HOURS
Refills: 0 | Status: DISCONTINUED | OUTPATIENT
Start: 2025-05-20 | End: 2025-05-24

## 2025-05-20 RX ORDER — IPRATROPIUM BROMIDE AND ALBUTEROL SULFATE .5; 2.5 MG/3ML; MG/3ML
3 SOLUTION RESPIRATORY (INHALATION) EVERY 6 HOURS
Refills: 0 | Status: DISCONTINUED | OUTPATIENT
Start: 2025-05-20 | End: 2025-05-24

## 2025-05-20 RX ORDER — HEPARIN SODIUM 1000 [USP'U]/ML
5000 INJECTION INTRAVENOUS; SUBCUTANEOUS EVERY 12 HOURS
Refills: 0 | Status: DISCONTINUED | OUTPATIENT
Start: 2025-05-20 | End: 2025-05-24

## 2025-05-20 RX ORDER — MAGNESIUM, ALUMINUM HYDROXIDE 200-200 MG
30 TABLET,CHEWABLE ORAL EVERY 4 HOURS
Refills: 0 | Status: DISCONTINUED | OUTPATIENT
Start: 2025-05-20 | End: 2025-05-24

## 2025-05-20 RX ADMIN — SODIUM CHLORIDE 75 MILLILITER(S): 9 INJECTION, SOLUTION INTRAVENOUS at 15:13

## 2025-05-20 RX ADMIN — Medication 30 MILLILITER(S): at 20:11

## 2025-05-20 RX ADMIN — HYDROXYUREA 1500 MILLIGRAM(S): 500 CAPSULE ORAL at 18:55

## 2025-05-20 RX ADMIN — FOLIC ACID 1 MILLIGRAM(S): 1 TABLET ORAL at 18:55

## 2025-05-20 RX ADMIN — Medication 650 MILLIGRAM(S): at 23:13

## 2025-05-20 RX ADMIN — Medication 30 MILLILITER(S): at 02:18

## 2025-05-20 RX ADMIN — SODIUM CHLORIDE 75 MILLILITER(S): 9 INJECTION, SOLUTION INTRAVENOUS at 02:24

## 2025-05-20 RX ADMIN — Medication 30 MILLILITER(S): at 08:45

## 2025-05-20 RX ADMIN — Medication 2 MILLIGRAM(S): at 01:20

## 2025-05-21 DIAGNOSIS — R50.9 FEVER, UNSPECIFIED: ICD-10-CM

## 2025-05-21 LAB
ADD ON TEST-SPECIMEN IN LAB: SIGNIFICANT CHANGE UP
ALBUMIN SERPL ELPH-MCNC: 4 G/DL — SIGNIFICANT CHANGE UP (ref 3.3–5)
ALP SERPL-CCNC: 114 U/L — SIGNIFICANT CHANGE UP (ref 40–120)
ALT FLD-CCNC: 44 U/L — HIGH (ref 4–33)
ANION GAP SERPL CALC-SCNC: 12 MMOL/L — SIGNIFICANT CHANGE UP (ref 7–14)
ANION GAP SERPL CALC-SCNC: 13 MMOL/L — SIGNIFICANT CHANGE UP (ref 7–14)
APPEARANCE UR: CLEAR — SIGNIFICANT CHANGE UP
AST SERPL-CCNC: 72 U/L — HIGH (ref 4–32)
B PERT DNA SPEC QL NAA+PROBE: SIGNIFICANT CHANGE UP
B PERT+PARAPERT DNA PNL SPEC NAA+PROBE: SIGNIFICANT CHANGE UP
BACTERIA # UR AUTO: NEGATIVE /HPF — SIGNIFICANT CHANGE UP
BILIRUB DIRECT SERPL-MCNC: 0.5 MG/DL — HIGH (ref 0–0.3)
BILIRUB INDIRECT FLD-MCNC: 1.3 MG/DL — HIGH (ref 0–1)
BILIRUB SERPL-MCNC: 1.8 MG/DL — HIGH (ref 0.2–1.2)
BILIRUB UR-MCNC: NEGATIVE — SIGNIFICANT CHANGE UP
BUN SERPL-MCNC: 7 MG/DL — SIGNIFICANT CHANGE UP (ref 7–23)
BUN SERPL-MCNC: 7 MG/DL — SIGNIFICANT CHANGE UP (ref 7–23)
C PNEUM DNA SPEC QL NAA+PROBE: SIGNIFICANT CHANGE UP
CALCIUM SERPL-MCNC: 8.6 MG/DL — SIGNIFICANT CHANGE UP (ref 8.4–10.5)
CALCIUM SERPL-MCNC: 8.8 MG/DL — SIGNIFICANT CHANGE UP (ref 8.4–10.5)
CAST: 0 /LPF — SIGNIFICANT CHANGE UP (ref 0–4)
CHLORIDE SERPL-SCNC: 101 MMOL/L — SIGNIFICANT CHANGE UP (ref 98–107)
CHLORIDE SERPL-SCNC: 99 MMOL/L — SIGNIFICANT CHANGE UP (ref 98–107)
CO2 SERPL-SCNC: 18 MMOL/L — LOW (ref 22–31)
CO2 SERPL-SCNC: 19 MMOL/L — LOW (ref 22–31)
COLOR SPEC: YELLOW — SIGNIFICANT CHANGE UP
CREAT SERPL-MCNC: 0.57 MG/DL — SIGNIFICANT CHANGE UP (ref 0.5–1.3)
CREAT SERPL-MCNC: 0.6 MG/DL — SIGNIFICANT CHANGE UP (ref 0.5–1.3)
DIFF PNL FLD: NEGATIVE — SIGNIFICANT CHANGE UP
EGFR: 131 ML/MIN/1.73M2 — SIGNIFICANT CHANGE UP
EGFR: 131 ML/MIN/1.73M2 — SIGNIFICANT CHANGE UP
EGFR: 133 ML/MIN/1.73M2 — SIGNIFICANT CHANGE UP
EGFR: 133 ML/MIN/1.73M2 — SIGNIFICANT CHANGE UP
FLUAV AG NPH QL: SIGNIFICANT CHANGE UP
FLUAV SUBTYP SPEC NAA+PROBE: SIGNIFICANT CHANGE UP
FLUBV AG NPH QL: SIGNIFICANT CHANGE UP
FLUBV RNA SPEC QL NAA+PROBE: SIGNIFICANT CHANGE UP
GAS PNL BLDV: SIGNIFICANT CHANGE UP
GLUCOSE SERPL-MCNC: 102 MG/DL — HIGH (ref 70–99)
GLUCOSE SERPL-MCNC: 133 MG/DL — HIGH (ref 70–99)
GLUCOSE UR QL: NEGATIVE MG/DL — SIGNIFICANT CHANGE UP
HADV DNA SPEC QL NAA+PROBE: SIGNIFICANT CHANGE UP
HCOV 229E RNA SPEC QL NAA+PROBE: SIGNIFICANT CHANGE UP
HCOV HKU1 RNA SPEC QL NAA+PROBE: SIGNIFICANT CHANGE UP
HCOV NL63 RNA SPEC QL NAA+PROBE: SIGNIFICANT CHANGE UP
HCOV OC43 RNA SPEC QL NAA+PROBE: SIGNIFICANT CHANGE UP
HCT VFR BLD CALC: 20.8 % — CRITICAL LOW (ref 34.5–45)
HCT VFR BLD CALC: 21.5 % — LOW (ref 34.5–45)
HGB BLD-MCNC: 7.6 G/DL — LOW (ref 11.5–15.5)
HGB BLD-MCNC: 7.8 G/DL — LOW (ref 11.5–15.5)
HMPV RNA SPEC QL NAA+PROBE: SIGNIFICANT CHANGE UP
HPIV1 RNA SPEC QL NAA+PROBE: SIGNIFICANT CHANGE UP
HPIV2 RNA SPEC QL NAA+PROBE: SIGNIFICANT CHANGE UP
HPIV3 RNA SPEC QL NAA+PROBE: SIGNIFICANT CHANGE UP
HPIV4 RNA SPEC QL NAA+PROBE: SIGNIFICANT CHANGE UP
KETONES UR QL: NEGATIVE MG/DL — SIGNIFICANT CHANGE UP
LACTATE SERPL-SCNC: 0.5 MMOL/L — SIGNIFICANT CHANGE UP (ref 0.5–2)
LDH SERPL L TO P-CCNC: 1173 U/L — HIGH (ref 135–225)
LEUKOCYTE ESTERASE UR-ACNC: ABNORMAL
M PNEUMO DNA SPEC QL NAA+PROBE: SIGNIFICANT CHANGE UP
MAGNESIUM SERPL-MCNC: 1.7 MG/DL — SIGNIFICANT CHANGE UP (ref 1.6–2.6)
MAGNESIUM SERPL-MCNC: 1.9 MG/DL — SIGNIFICANT CHANGE UP (ref 1.6–2.6)
MCHC RBC-ENTMCNC: 31.3 PG — SIGNIFICANT CHANGE UP (ref 27–34)
MCHC RBC-ENTMCNC: 32.2 PG — SIGNIFICANT CHANGE UP (ref 27–34)
MCHC RBC-ENTMCNC: 36.3 G/DL — HIGH (ref 32–36)
MCHC RBC-ENTMCNC: 36.5 G/DL — HIGH (ref 32–36)
MCV RBC AUTO: 86.3 FL — SIGNIFICANT CHANGE UP (ref 80–100)
MCV RBC AUTO: 88.1 FL — SIGNIFICANT CHANGE UP (ref 80–100)
NITRITE UR-MCNC: NEGATIVE — SIGNIFICANT CHANGE UP
NRBC # BLD AUTO: 1.23 K/UL — HIGH (ref 0–0)
NRBC # BLD AUTO: 1.47 K/UL — HIGH (ref 0–0)
NRBC # FLD: 1.23 K/UL — HIGH (ref 0–0)
NRBC # FLD: 1.47 K/UL — HIGH (ref 0–0)
NRBC BLD AUTO-RTO: 10 /100 WBCS — HIGH (ref 0–0)
NRBC BLD AUTO-RTO: 10 /100 WBCS — HIGH (ref 0–0)
PH UR: 6 — SIGNIFICANT CHANGE UP (ref 5–8)
PHOSPHATE SERPL-MCNC: 3.4 MG/DL — SIGNIFICANT CHANGE UP (ref 2.5–4.5)
PHOSPHATE SERPL-MCNC: 3.7 MG/DL — SIGNIFICANT CHANGE UP (ref 2.5–4.5)
PLATELET # BLD AUTO: 348 K/UL — SIGNIFICANT CHANGE UP (ref 150–400)
PLATELET # BLD AUTO: 358 K/UL — SIGNIFICANT CHANGE UP (ref 150–400)
POTASSIUM SERPL-MCNC: 3.9 MMOL/L — SIGNIFICANT CHANGE UP (ref 3.5–5.3)
POTASSIUM SERPL-MCNC: 4.2 MMOL/L — SIGNIFICANT CHANGE UP (ref 3.5–5.3)
POTASSIUM SERPL-SCNC: 3.9 MMOL/L — SIGNIFICANT CHANGE UP (ref 3.5–5.3)
POTASSIUM SERPL-SCNC: 4.2 MMOL/L — SIGNIFICANT CHANGE UP (ref 3.5–5.3)
PROT SERPL-MCNC: 6.6 G/DL — SIGNIFICANT CHANGE UP (ref 6–8.3)
PROT UR-MCNC: NEGATIVE MG/DL — SIGNIFICANT CHANGE UP
RAPID RVP RESULT: SIGNIFICANT CHANGE UP
RBC # BLD: 2.36 M/UL — LOW (ref 3.8–5.2)
RBC # BLD: 2.49 M/UL — LOW (ref 3.8–5.2)
RBC # BLD: 2.49 M/UL — LOW (ref 3.8–5.2)
RBC # FLD: 20.4 % — HIGH (ref 10.3–14.5)
RBC # FLD: 20.9 % — HIGH (ref 10.3–14.5)
RBC CASTS # UR COMP ASSIST: 2 /HPF — SIGNIFICANT CHANGE UP (ref 0–4)
RETICS #: 310 K/UL — HIGH (ref 25–125)
RETICS/RBC NFR: 12.5 % — HIGH (ref 0.5–2.5)
RSV RNA NPH QL NAA+NON-PROBE: SIGNIFICANT CHANGE UP
RSV RNA SPEC QL NAA+PROBE: SIGNIFICANT CHANGE UP
RV+EV RNA SPEC QL NAA+PROBE: SIGNIFICANT CHANGE UP
SARS-COV-2 RNA SPEC QL NAA+PROBE: SIGNIFICANT CHANGE UP
SARS-COV-2 RNA SPEC QL NAA+PROBE: SIGNIFICANT CHANGE UP
SODIUM SERPL-SCNC: 130 MMOL/L — LOW (ref 135–145)
SODIUM SERPL-SCNC: 132 MMOL/L — LOW (ref 135–145)
SOURCE RESPIRATORY: SIGNIFICANT CHANGE UP
SP GR SPEC: 1.01 — SIGNIFICANT CHANGE UP (ref 1–1.03)
SQUAMOUS # UR AUTO: 2 /HPF — SIGNIFICANT CHANGE UP (ref 0–5)
UROBILINOGEN FLD QL: 1 MG/DL — SIGNIFICANT CHANGE UP (ref 0.2–1)
WBC # BLD: 11.81 K/UL — HIGH (ref 3.8–10.5)
WBC # BLD: 13.99 K/UL — HIGH (ref 3.8–10.5)
WBC # FLD AUTO: 11.81 K/UL — HIGH (ref 3.8–10.5)
WBC # FLD AUTO: 13.99 K/UL — HIGH (ref 3.8–10.5)
WBC UR QL: 3 /HPF — SIGNIFICANT CHANGE UP (ref 0–5)

## 2025-05-21 PROCEDURE — 71045 X-RAY EXAM CHEST 1 VIEW: CPT | Mod: 26

## 2025-05-21 PROCEDURE — 99233 SBSQ HOSP IP/OBS HIGH 50: CPT

## 2025-05-21 RX ORDER — ACETAMINOPHEN 500 MG/5ML
725 LIQUID (ML) ORAL ONCE
Refills: 0 | Status: DISCONTINUED | OUTPATIENT
Start: 2025-05-21 | End: 2025-05-21

## 2025-05-21 RX ORDER — POLYETHYLENE GLYCOL 3350 17 G/17G
17 POWDER, FOR SOLUTION ORAL DAILY
Refills: 0 | Status: DISCONTINUED | OUTPATIENT
Start: 2025-05-21 | End: 2025-05-23

## 2025-05-21 RX ADMIN — HYDROXYUREA 1500 MILLIGRAM(S): 500 CAPSULE ORAL at 09:05

## 2025-05-21 RX ADMIN — FOLIC ACID 1 MILLIGRAM(S): 1 TABLET ORAL at 09:05

## 2025-05-21 RX ADMIN — POLYETHYLENE GLYCOL 3350 17 GRAM(S): 17 POWDER, FOR SOLUTION ORAL at 23:43

## 2025-05-21 RX ADMIN — HEPARIN SODIUM 5000 UNIT(S): 1000 INJECTION INTRAVENOUS; SUBCUTANEOUS at 06:48

## 2025-05-21 RX ADMIN — Medication 30 MILLILITER(S): at 08:25

## 2025-05-21 RX ADMIN — Medication 650 MILLIGRAM(S): at 00:40

## 2025-05-21 RX ADMIN — SODIUM CHLORIDE 75 MILLILITER(S): 9 INJECTION, SOLUTION INTRAVENOUS at 20:17

## 2025-05-21 RX ADMIN — Medication 650 MILLIGRAM(S): at 06:54

## 2025-05-21 RX ADMIN — Medication 30 MILLILITER(S): at 20:16

## 2025-05-21 RX ADMIN — SODIUM CHLORIDE 75 MILLILITER(S): 9 INJECTION, SOLUTION INTRAVENOUS at 04:44

## 2025-05-21 RX ADMIN — Medication 650 MILLIGRAM(S): at 15:43

## 2025-05-21 RX ADMIN — HEPARIN SODIUM 5000 UNIT(S): 1000 INJECTION INTRAVENOUS; SUBCUTANEOUS at 17:51

## 2025-05-21 RX ADMIN — Medication 650 MILLIGRAM(S): at 17:53

## 2025-05-22 DIAGNOSIS — R65.10 SYSTEMIC INFLAMMATORY RESPONSE SYNDROME (SIRS) OF NON-INFECTIOUS ORIGIN WITHOUT ACUTE ORGAN DYSFUNCTION: ICD-10-CM

## 2025-05-22 LAB
ADD ON TEST-SPECIMEN IN LAB: SIGNIFICANT CHANGE UP
ALBUMIN SERPL ELPH-MCNC: 4 G/DL — SIGNIFICANT CHANGE UP (ref 3.3–5)
ALP SERPL-CCNC: 110 U/L — SIGNIFICANT CHANGE UP (ref 40–120)
ALT FLD-CCNC: 44 U/L — HIGH (ref 4–33)
ANION GAP SERPL CALC-SCNC: 14 MMOL/L — SIGNIFICANT CHANGE UP (ref 7–14)
AST SERPL-CCNC: 57 U/L — HIGH (ref 4–32)
BASOPHILS # BLD AUTO: 0.03 K/UL — SIGNIFICANT CHANGE UP (ref 0–0.2)
BASOPHILS NFR BLD AUTO: 0.2 % — SIGNIFICANT CHANGE UP (ref 0–2)
BILIRUB SERPL-MCNC: 1.7 MG/DL — HIGH (ref 0.2–1.2)
BLD GP AB SCN SERPL QL: NEGATIVE — SIGNIFICANT CHANGE UP
BUN SERPL-MCNC: 6 MG/DL — LOW (ref 7–23)
CALCIUM SERPL-MCNC: 8.8 MG/DL — SIGNIFICANT CHANGE UP (ref 8.4–10.5)
CHLORIDE SERPL-SCNC: 99 MMOL/L — SIGNIFICANT CHANGE UP (ref 98–107)
CO2 SERPL-SCNC: 16 MMOL/L — LOW (ref 22–31)
CREAT SERPL-MCNC: 0.53 MG/DL — SIGNIFICANT CHANGE UP (ref 0.5–1.3)
EGFR: 135 ML/MIN/1.73M2 — SIGNIFICANT CHANGE UP
EGFR: 135 ML/MIN/1.73M2 — SIGNIFICANT CHANGE UP
EOSINOPHIL # BLD AUTO: 0.09 K/UL — SIGNIFICANT CHANGE UP (ref 0–0.5)
EOSINOPHIL NFR BLD AUTO: 0.7 % — SIGNIFICANT CHANGE UP (ref 0–6)
GLUCOSE SERPL-MCNC: 168 MG/DL — HIGH (ref 70–99)
HAPTOGLOB SERPL-MCNC: <20 MG/DL — LOW (ref 34–200)
HCT VFR BLD CALC: 19 % — CRITICAL LOW (ref 34.5–45)
HGB BLD-MCNC: 6.9 G/DL — CRITICAL LOW (ref 11.5–15.5)
IANC: 9.59 K/UL — HIGH (ref 1.8–7.4)
IMM GRANULOCYTES NFR BLD AUTO: 1.1 % — HIGH (ref 0–0.9)
LACTATE SERPL-SCNC: 2.2 MMOL/L — HIGH (ref 0.5–2)
LDH SERPL L TO P-CCNC: 976 U/L — HIGH (ref 135–225)
LYMPHOCYTES # BLD AUTO: 1.42 K/UL — SIGNIFICANT CHANGE UP (ref 1–3.3)
LYMPHOCYTES # BLD AUTO: 11.5 % — LOW (ref 13–44)
MAGNESIUM SERPL-MCNC: 1.8 MG/DL — SIGNIFICANT CHANGE UP (ref 1.6–2.6)
MCHC RBC-ENTMCNC: 31.8 PG — SIGNIFICANT CHANGE UP (ref 27–34)
MCHC RBC-ENTMCNC: 36.3 G/DL — HIGH (ref 32–36)
MCV RBC AUTO: 87.6 FL — SIGNIFICANT CHANGE UP (ref 80–100)
MONOCYTES # BLD AUTO: 1.13 K/UL — HIGH (ref 0–0.9)
MONOCYTES NFR BLD AUTO: 9.1 % — SIGNIFICANT CHANGE UP (ref 2–14)
NEUTROPHILS # BLD AUTO: 9.59 K/UL — HIGH (ref 1.8–7.4)
NEUTROPHILS NFR BLD AUTO: 77.4 % — HIGH (ref 43–77)
NRBC # BLD AUTO: 0.4 K/UL — HIGH (ref 0–0)
NRBC # FLD: 0.4 K/UL — HIGH (ref 0–0)
NRBC BLD AUTO-RTO: 3 /100 WBCS — HIGH (ref 0–0)
PHOSPHATE SERPL-MCNC: 2 MG/DL — LOW (ref 2.5–4.5)
PLATELET # BLD AUTO: 337 K/UL — SIGNIFICANT CHANGE UP (ref 150–400)
POTASSIUM SERPL-MCNC: 3.5 MMOL/L — SIGNIFICANT CHANGE UP (ref 3.5–5.3)
POTASSIUM SERPL-SCNC: 3.5 MMOL/L — SIGNIFICANT CHANGE UP (ref 3.5–5.3)
PROCALCITONIN SERPL-MCNC: 0.11 NG/ML — HIGH (ref 0.02–0.1)
PROT SERPL-MCNC: 6.6 G/DL — SIGNIFICANT CHANGE UP (ref 6–8.3)
RBC # BLD: 2.17 M/UL — LOW (ref 3.8–5.2)
RBC # BLD: 2.17 M/UL — LOW (ref 3.8–5.2)
RBC # FLD: 21.3 % — HIGH (ref 10.3–14.5)
RETICS #: 212.2 K/UL — HIGH (ref 25–125)
RETICS/RBC NFR: 9.8 % — HIGH (ref 0.5–2.5)
RH IG SCN BLD-IMP: POSITIVE — SIGNIFICANT CHANGE UP
SODIUM SERPL-SCNC: 129 MMOL/L — LOW (ref 135–145)
WBC # BLD: 12.4 K/UL — HIGH (ref 3.8–10.5)
WBC # FLD AUTO: 12.4 K/UL — HIGH (ref 3.8–10.5)

## 2025-05-22 PROCEDURE — 99233 SBSQ HOSP IP/OBS HIGH 50: CPT

## 2025-05-22 PROCEDURE — 71045 X-RAY EXAM CHEST 1 VIEW: CPT | Mod: 26

## 2025-05-22 RX ORDER — SODIUM CHLORIDE 9 G/1000ML
1000 INJECTION, SOLUTION INTRAVENOUS
Refills: 0 | Status: DISCONTINUED | OUTPATIENT
Start: 2025-05-22 | End: 2025-05-24

## 2025-05-22 RX ORDER — CEFTRIAXONE 500 MG/1
1000 INJECTION, POWDER, FOR SOLUTION INTRAMUSCULAR; INTRAVENOUS EVERY 24 HOURS
Refills: 0 | Status: DISCONTINUED | OUTPATIENT
Start: 2025-05-22 | End: 2025-05-23

## 2025-05-22 RX ORDER — AZITHROMYCIN 250 MG
CAPSULE ORAL
Refills: 0 | Status: DISCONTINUED | OUTPATIENT
Start: 2025-05-22 | End: 2025-05-23

## 2025-05-22 RX ORDER — AZITHROMYCIN 250 MG
500 CAPSULE ORAL EVERY 24 HOURS
Refills: 0 | Status: DISCONTINUED | OUTPATIENT
Start: 2025-05-23 | End: 2025-05-23

## 2025-05-22 RX ORDER — SODIUM PHOSPHATE,DIBASIC DIHYD
15 POWDER (GRAM) MISCELLANEOUS ONCE
Refills: 0 | Status: COMPLETED | OUTPATIENT
Start: 2025-05-22 | End: 2025-05-22

## 2025-05-22 RX ORDER — SOD PHOS DI, MONO/K PHOS MONO 250 MG
1 TABLET ORAL ONCE
Refills: 0 | Status: COMPLETED | OUTPATIENT
Start: 2025-05-22 | End: 2025-05-22

## 2025-05-22 RX ORDER — ACETAMINOPHEN 500 MG/5ML
725 LIQUID (ML) ORAL ONCE
Refills: 0 | Status: COMPLETED | OUTPATIENT
Start: 2025-05-22 | End: 2025-05-22

## 2025-05-22 RX ORDER — AZITHROMYCIN 250 MG
500 CAPSULE ORAL ONCE
Refills: 0 | Status: COMPLETED | OUTPATIENT
Start: 2025-05-22 | End: 2025-05-22

## 2025-05-22 RX ADMIN — Medication 63.75 MILLIMOLE(S): at 15:25

## 2025-05-22 RX ADMIN — HEPARIN SODIUM 5000 UNIT(S): 1000 INJECTION INTRAVENOUS; SUBCUTANEOUS at 05:43

## 2025-05-22 RX ADMIN — CEFTRIAXONE 100 MILLIGRAM(S): 500 INJECTION, POWDER, FOR SOLUTION INTRAMUSCULAR; INTRAVENOUS at 12:16

## 2025-05-22 RX ADMIN — SODIUM CHLORIDE 50 MILLILITER(S): 9 INJECTION, SOLUTION INTRAVENOUS at 16:28

## 2025-05-22 RX ADMIN — Medication 30 MILLILITER(S): at 07:12

## 2025-05-22 RX ADMIN — Medication 1 PACKET(S): at 05:43

## 2025-05-22 RX ADMIN — Medication 30 MILLILITER(S): at 19:13

## 2025-05-22 RX ADMIN — FOLIC ACID 1 MILLIGRAM(S): 1 TABLET ORAL at 12:18

## 2025-05-22 RX ADMIN — Medication 290 MILLIGRAM(S): at 00:20

## 2025-05-22 RX ADMIN — Medication 250 MILLIGRAM(S): at 12:48

## 2025-05-22 RX ADMIN — HYDROXYUREA 1500 MILLIGRAM(S): 500 CAPSULE ORAL at 12:18

## 2025-05-23 LAB
-  GENTAMICIN: SIGNIFICANT CHANGE UP
-  NITROFURANTOIN: SIGNIFICANT CHANGE UP
-  OXACILLIN: SIGNIFICANT CHANGE UP
-  PENICILLIN: SIGNIFICANT CHANGE UP
-  RIFAMPIN: SIGNIFICANT CHANGE UP
-  TETRACYCLINE: SIGNIFICANT CHANGE UP
-  TRIMETHOPRIM/SULFAMETHOXAZOLE: SIGNIFICANT CHANGE UP
-  VANCOMYCIN: SIGNIFICANT CHANGE UP
ALBUMIN SERPL ELPH-MCNC: 3.8 G/DL — SIGNIFICANT CHANGE UP (ref 3.3–5)
ALP SERPL-CCNC: 108 U/L — SIGNIFICANT CHANGE UP (ref 40–120)
ALT FLD-CCNC: 40 U/L — HIGH (ref 4–33)
ANION GAP SERPL CALC-SCNC: 13 MMOL/L — SIGNIFICANT CHANGE UP (ref 7–14)
AST SERPL-CCNC: 46 U/L — HIGH (ref 4–32)
BASOPHILS # BLD AUTO: 0.05 K/UL — SIGNIFICANT CHANGE UP (ref 0–0.2)
BASOPHILS NFR BLD AUTO: 0.5 % — SIGNIFICANT CHANGE UP (ref 0–2)
BILIRUB SERPL-MCNC: 1.6 MG/DL — HIGH (ref 0.2–1.2)
BUN SERPL-MCNC: 4 MG/DL — LOW (ref 7–23)
CALCIUM SERPL-MCNC: 8.9 MG/DL — SIGNIFICANT CHANGE UP (ref 8.4–10.5)
CHLORIDE SERPL-SCNC: 103 MMOL/L — SIGNIFICANT CHANGE UP (ref 98–107)
CO2 SERPL-SCNC: 19 MMOL/L — LOW (ref 22–31)
CREAT SERPL-MCNC: 0.49 MG/DL — LOW (ref 0.5–1.3)
CULTURE RESULTS: ABNORMAL
EGFR: 137 ML/MIN/1.73M2 — SIGNIFICANT CHANGE UP
EGFR: 137 ML/MIN/1.73M2 — SIGNIFICANT CHANGE UP
EOSINOPHIL # BLD AUTO: 0.25 K/UL — SIGNIFICANT CHANGE UP (ref 0–0.5)
EOSINOPHIL NFR BLD AUTO: 2.5 % — SIGNIFICANT CHANGE UP (ref 0–6)
GLUCOSE SERPL-MCNC: 90 MG/DL — SIGNIFICANT CHANGE UP (ref 70–99)
HCT VFR BLD CALC: 22.4 % — LOW (ref 34.5–45)
HGB BLD-MCNC: 8.2 G/DL — LOW (ref 11.5–15.5)
IANC: 5.19 K/UL — SIGNIFICANT CHANGE UP (ref 1.8–7.4)
IMM GRANULOCYTES NFR BLD AUTO: 1.1 % — HIGH (ref 0–0.9)
LDH SERPL L TO P-CCNC: 886 U/L — HIGH (ref 135–225)
LYMPHOCYTES # BLD AUTO: 3 K/UL — SIGNIFICANT CHANGE UP (ref 1–3.3)
LYMPHOCYTES # BLD AUTO: 30.2 % — SIGNIFICANT CHANGE UP (ref 13–44)
MCHC RBC-ENTMCNC: 31.1 PG — SIGNIFICANT CHANGE UP (ref 27–34)
MCHC RBC-ENTMCNC: 36.6 G/DL — HIGH (ref 32–36)
MCV RBC AUTO: 84.8 FL — SIGNIFICANT CHANGE UP (ref 80–100)
METHOD TYPE: SIGNIFICANT CHANGE UP
MONOCYTES # BLD AUTO: 1.34 K/UL — HIGH (ref 0–0.9)
MONOCYTES NFR BLD AUTO: 13.5 % — SIGNIFICANT CHANGE UP (ref 2–14)
NEUTROPHILS # BLD AUTO: 5.19 K/UL — SIGNIFICANT CHANGE UP (ref 1.8–7.4)
NEUTROPHILS NFR BLD AUTO: 52.2 % — SIGNIFICANT CHANGE UP (ref 43–77)
NRBC # BLD AUTO: 0.22 K/UL — HIGH (ref 0–0)
NRBC # FLD: 0.22 K/UL — HIGH (ref 0–0)
NRBC BLD AUTO-RTO: 2 /100 WBCS — HIGH (ref 0–0)
ORGANISM # SPEC MICROSCOPIC CNT: ABNORMAL
ORGANISM # SPEC MICROSCOPIC CNT: ABNORMAL
PLATELET # BLD AUTO: 387 K/UL — SIGNIFICANT CHANGE UP (ref 150–400)
POTASSIUM SERPL-MCNC: 4.2 MMOL/L — SIGNIFICANT CHANGE UP (ref 3.5–5.3)
POTASSIUM SERPL-SCNC: 4.2 MMOL/L — SIGNIFICANT CHANGE UP (ref 3.5–5.3)
PROT SERPL-MCNC: 6.9 G/DL — SIGNIFICANT CHANGE UP (ref 6–8.3)
RBC # BLD: 2.64 M/UL — LOW (ref 3.8–5.2)
RBC # BLD: 2.64 M/UL — LOW (ref 3.8–5.2)
RBC # FLD: 18.9 % — HIGH (ref 10.3–14.5)
RETICS #: 239.4 K/UL — HIGH (ref 25–125)
RETICS/RBC NFR: 9.1 % — HIGH (ref 0.5–2.5)
SODIUM SERPL-SCNC: 135 MMOL/L — SIGNIFICANT CHANGE UP (ref 135–145)
SPECIMEN SOURCE: SIGNIFICANT CHANGE UP
WBC # BLD: 9.94 K/UL — SIGNIFICANT CHANGE UP (ref 3.8–10.5)
WBC # FLD AUTO: 9.94 K/UL — SIGNIFICANT CHANGE UP (ref 3.8–10.5)

## 2025-05-23 PROCEDURE — 99233 SBSQ HOSP IP/OBS HIGH 50: CPT

## 2025-05-23 PROCEDURE — 71045 X-RAY EXAM CHEST 1 VIEW: CPT | Mod: 26

## 2025-05-23 RX ORDER — SENNA 187 MG
2 TABLET ORAL AT BEDTIME
Refills: 0 | Status: DISCONTINUED | OUTPATIENT
Start: 2025-05-23 | End: 2025-05-24

## 2025-05-23 RX ORDER — POLYETHYLENE GLYCOL 3350 17 G/17G
17 POWDER, FOR SOLUTION ORAL DAILY
Refills: 0 | Status: DISCONTINUED | OUTPATIENT
Start: 2025-05-23 | End: 2025-05-24

## 2025-05-23 RX ORDER — OXYCODONE HYDROCHLORIDE 30 MG/1
5 TABLET ORAL EVERY 4 HOURS
Refills: 0 | Status: DISCONTINUED | OUTPATIENT
Start: 2025-05-23 | End: 2025-05-24

## 2025-05-23 RX ORDER — OXYCODONE HYDROCHLORIDE 30 MG/1
10 TABLET ORAL EVERY 4 HOURS
Refills: 0 | Status: DISCONTINUED | OUTPATIENT
Start: 2025-05-23 | End: 2025-05-24

## 2025-05-23 RX ADMIN — FOLIC ACID 1 MILLIGRAM(S): 1 TABLET ORAL at 13:57

## 2025-05-23 RX ADMIN — SODIUM CHLORIDE 50 MILLILITER(S): 9 INJECTION, SOLUTION INTRAVENOUS at 02:05

## 2025-05-23 RX ADMIN — Medication 250 MILLIGRAM(S): at 12:54

## 2025-05-23 RX ADMIN — Medication 30 MILLILITER(S): at 07:22

## 2025-05-23 RX ADMIN — POLYETHYLENE GLYCOL 3350 17 GRAM(S): 17 POWDER, FOR SOLUTION ORAL at 13:56

## 2025-05-23 RX ADMIN — CEFTRIAXONE 100 MILLIGRAM(S): 500 INJECTION, POWDER, FOR SOLUTION INTRAMUSCULAR; INTRAVENOUS at 13:54

## 2025-05-23 RX ADMIN — HYDROXYUREA 1500 MILLIGRAM(S): 500 CAPSULE ORAL at 13:57

## 2025-05-23 RX ADMIN — Medication 2 TABLET(S): at 21:40

## 2025-05-24 ENCOUNTER — TRANSCRIPTION ENCOUNTER (OUTPATIENT)
Age: 20
End: 2025-05-24

## 2025-05-24 VITALS
RESPIRATION RATE: 16 BRPM | OXYGEN SATURATION: 99 % | DIASTOLIC BLOOD PRESSURE: 61 MMHG | SYSTOLIC BLOOD PRESSURE: 103 MMHG | HEART RATE: 68 BPM | TEMPERATURE: 99 F

## 2025-05-24 LAB
ALBUMIN SERPL ELPH-MCNC: 4.2 G/DL — SIGNIFICANT CHANGE UP (ref 3.3–5)
ALP SERPL-CCNC: 136 U/L — HIGH (ref 40–120)
ALT FLD-CCNC: 74 U/L — HIGH (ref 4–33)
ANION GAP SERPL CALC-SCNC: 14 MMOL/L — SIGNIFICANT CHANGE UP (ref 7–14)
AST SERPL-CCNC: 89 U/L — HIGH (ref 4–32)
BILIRUB SERPL-MCNC: 1.8 MG/DL — HIGH (ref 0.2–1.2)
BUN SERPL-MCNC: 7 MG/DL — SIGNIFICANT CHANGE UP (ref 7–23)
CALCIUM SERPL-MCNC: 9.3 MG/DL — SIGNIFICANT CHANGE UP (ref 8.4–10.5)
CHLORIDE SERPL-SCNC: 104 MMOL/L — SIGNIFICANT CHANGE UP (ref 98–107)
CO2 SERPL-SCNC: 19 MMOL/L — LOW (ref 22–31)
CREAT SERPL-MCNC: 0.48 MG/DL — LOW (ref 0.5–1.3)
EGFR: 138 ML/MIN/1.73M2 — SIGNIFICANT CHANGE UP
EGFR: 138 ML/MIN/1.73M2 — SIGNIFICANT CHANGE UP
GLUCOSE SERPL-MCNC: 101 MG/DL — HIGH (ref 70–99)
HCT VFR BLD CALC: 23.2 % — LOW (ref 34.5–45)
HGB BLD-MCNC: 8.4 G/DL — LOW (ref 11.5–15.5)
LDH SERPL L TO P-CCNC: 858 U/L — HIGH (ref 135–225)
MAGNESIUM SERPL-MCNC: 2.1 MG/DL — SIGNIFICANT CHANGE UP (ref 1.6–2.6)
MCHC RBC-ENTMCNC: 30.8 PG — SIGNIFICANT CHANGE UP (ref 27–34)
MCHC RBC-ENTMCNC: 36.2 G/DL — HIGH (ref 32–36)
MCV RBC AUTO: 85 FL — SIGNIFICANT CHANGE UP (ref 80–100)
NRBC # BLD AUTO: 0.18 K/UL — HIGH (ref 0–0)
NRBC # FLD: 0.18 K/UL — HIGH (ref 0–0)
NRBC BLD AUTO-RTO: 2 /100 WBCS — HIGH (ref 0–0)
PHOSPHATE SERPL-MCNC: 4.9 MG/DL — HIGH (ref 2.5–4.5)
PLATELET # BLD AUTO: 449 K/UL — HIGH (ref 150–400)
POTASSIUM SERPL-MCNC: 4.2 MMOL/L — SIGNIFICANT CHANGE UP (ref 3.5–5.3)
POTASSIUM SERPL-SCNC: 4.2 MMOL/L — SIGNIFICANT CHANGE UP (ref 3.5–5.3)
PROT SERPL-MCNC: 7.5 G/DL — SIGNIFICANT CHANGE UP (ref 6–8.3)
RBC # BLD: 2.73 M/UL — LOW (ref 3.8–5.2)
RBC # FLD: 18.6 % — HIGH (ref 10.3–14.5)
SODIUM SERPL-SCNC: 137 MMOL/L — SIGNIFICANT CHANGE UP (ref 135–145)
WBC # BLD: 9.69 K/UL — SIGNIFICANT CHANGE UP (ref 3.8–10.5)
WBC # FLD AUTO: 9.69 K/UL — SIGNIFICANT CHANGE UP (ref 3.8–10.5)

## 2025-05-24 PROCEDURE — 99239 HOSP IP/OBS DSCHRG MGMT >30: CPT

## 2025-05-24 RX ORDER — SENNA 187 MG
2 TABLET ORAL
Qty: 60 | Refills: 0
Start: 2025-05-24 | End: 2025-06-22

## 2025-05-24 RX ORDER — HYDROXYUREA 500 MG/1
3 CAPSULE ORAL
Qty: 90 | Refills: 0
Start: 2025-05-24 | End: 2025-06-22

## 2025-05-24 RX ORDER — OXYCODONE HYDROCHLORIDE 30 MG/1
1 TABLET ORAL
Qty: 20 | Refills: 0
Start: 2025-05-24 | End: 2025-05-28

## 2025-05-24 RX ORDER — FOLIC ACID 1 MG/1
1 TABLET ORAL
Qty: 30 | Refills: 0
Start: 2025-05-24 | End: 2025-06-22

## 2025-05-24 RX ORDER — HYDROXYUREA 500 MG/1
0 CAPSULE ORAL
Qty: 0 | Refills: 0 | DISCHARGE

## 2025-05-24 RX ORDER — IBUPROFEN 200 MG
1 TABLET ORAL
Qty: 15 | Refills: 0
Start: 2025-05-24 | End: 2025-05-28

## 2025-05-24 RX ORDER — OXYCODONE HYDROCHLORIDE 30 MG/1
1 TABLET ORAL
Qty: 0 | Refills: 0 | DISCHARGE
Start: 2025-05-24

## 2025-05-24 RX ORDER — OXYCODONE HYDROCHLORIDE 30 MG/1
0 TABLET ORAL
Qty: 0 | Refills: 0 | DISCHARGE

## 2025-05-24 RX ORDER — FOLIC ACID 1 MG/1
1 TABLET ORAL
Qty: 0 | Refills: 0 | DISCHARGE
Start: 2025-05-24

## 2025-05-24 RX ORDER — SENNA 187 MG
2 TABLET ORAL
Qty: 0 | Refills: 0 | DISCHARGE
Start: 2025-05-24

## 2025-05-24 RX ORDER — ACETAMINOPHEN 500 MG/5ML
2 LIQUID (ML) ORAL
Qty: 0 | Refills: 0 | DISCHARGE
Start: 2025-05-24

## 2025-06-10 ENCOUNTER — NON-APPOINTMENT (OUTPATIENT)
Age: 20
End: 2025-06-10

## 2025-06-11 ENCOUNTER — OUTPATIENT (OUTPATIENT)
Dept: OUTPATIENT SERVICES | Facility: HOSPITAL | Age: 20
LOS: 1 days | Discharge: HOME | End: 2025-06-11

## 2025-06-11 DIAGNOSIS — D57.1 SICKLE-CELL DISEASE WITHOUT CRISIS: ICD-10-CM

## 2025-06-12 ENCOUNTER — APPOINTMENT (OUTPATIENT)
Dept: HEMATOLOGY ONCOLOGY | Facility: CLINIC | Age: 20
End: 2025-06-12
Payer: COMMERCIAL

## 2025-06-12 ENCOUNTER — LABORATORY RESULT (OUTPATIENT)
Age: 20
End: 2025-06-12

## 2025-06-12 VITALS
WEIGHT: 101.61 LBS | SYSTOLIC BLOOD PRESSURE: 106 MMHG | TEMPERATURE: 97.5 F | DIASTOLIC BLOOD PRESSURE: 65 MMHG | BODY MASS INDEX: 16.14 KG/M2 | RESPIRATION RATE: 16 BRPM | HEART RATE: 99 BPM | OXYGEN SATURATION: 99 % | HEIGHT: 66.61 IN

## 2025-06-12 PROCEDURE — G2211 COMPLEX E/M VISIT ADD ON: CPT | Mod: NC

## 2025-06-12 PROCEDURE — 99204 OFFICE O/P NEW MOD 45 MIN: CPT
